# Patient Record
Sex: FEMALE | Race: WHITE
[De-identification: names, ages, dates, MRNs, and addresses within clinical notes are randomized per-mention and may not be internally consistent; named-entity substitution may affect disease eponyms.]

---

## 2017-04-30 ENCOUNTER — HOSPITAL ENCOUNTER (EMERGENCY)
Dept: HOSPITAL 23 - SED | Age: 42
Discharge: HOME | End: 2017-04-30
Payer: COMMERCIAL
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
## 2017-04-30 DIAGNOSIS — S93.401A: Primary | ICD-10-CM
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
## 2017-04-30 DIAGNOSIS — F17.210: ICD-10-CM

## 2017-04-30 DIAGNOSIS — Z88.5: ICD-10-CM

## 2017-04-30 DIAGNOSIS — I10: ICD-10-CM

## 2017-04-30 DIAGNOSIS — Z79.899: ICD-10-CM

## 2018-05-25 ENCOUNTER — OFFICE VISIT (OUTPATIENT)
Dept: OBSTETRICS AND GYNECOLOGY | Age: 43
End: 2018-05-25

## 2018-05-25 VITALS
DIASTOLIC BLOOD PRESSURE: 70 MMHG | WEIGHT: 138 LBS | SYSTOLIC BLOOD PRESSURE: 110 MMHG | BODY MASS INDEX: 25.4 KG/M2 | HEIGHT: 62 IN

## 2018-05-25 DIAGNOSIS — N39.44 NOCTURNAL ENURESIS: ICD-10-CM

## 2018-05-25 DIAGNOSIS — Z01.419 ENCOUNTER FOR GYNECOLOGICAL EXAMINATION: Primary | ICD-10-CM

## 2018-05-25 PROCEDURE — 99396 PREV VISIT EST AGE 40-64: CPT | Performed by: NURSE PRACTITIONER

## 2018-05-25 RX ORDER — OMEPRAZOLE 40 MG/1
CAPSULE, DELAYED RELEASE ORAL
Refills: 2 | COMMUNITY
Start: 2018-04-30 | End: 2021-03-22

## 2018-05-25 RX ORDER — BREXPIPRAZOLE 1 MG/1
TABLET ORAL DAILY
Refills: 4 | COMMUNITY
Start: 2018-04-10 | End: 2021-03-22

## 2018-05-25 RX ORDER — TRAMADOL HYDROCHLORIDE 50 MG/1
TABLET ORAL
Refills: 0 | COMMUNITY
Start: 2018-03-21 | End: 2021-03-22

## 2018-05-25 RX ORDER — DULOXETIN HYDROCHLORIDE 60 MG/1
CAPSULE, DELAYED RELEASE ORAL
Refills: 0 | COMMUNITY
Start: 2018-02-24 | End: 2021-03-22

## 2018-05-25 RX ORDER — PANCRELIPASE LIPASE, PANCRELIPASE PROTEASE, PANCRELIPASE AMYLASE 5000; 17000; 24000 [USP'U]/1; [USP'U]/1; [USP'U]/1
CAPSULE, DELAYED RELEASE ORAL
Refills: 2 | COMMUNITY
Start: 2018-05-01

## 2018-05-25 RX ORDER — TRAZODONE HYDROCHLORIDE 50 MG/1
TABLET ORAL
Refills: 2 | COMMUNITY
Start: 2018-04-10 | End: 2021-03-22

## 2018-05-25 NOTE — PROGRESS NOTES
Subjective       History of Present Illness    Chief Complaint   Patient presents with   • Gynecologic Exam       Bushra Henriquez is a 42 y.o. female who presents for annual exam.  History of ablation.  Very rare bleeding. Still feels cyclical symptoms. Had TVT 14 years ago. She is having issues at night occasionally with not being able to hold her bladder over night. It only started after she started taking sleeping pills.  Dr Sampson did her TVT.  She went through a divorce and has a new partner. No problems otherwise.    OB History    Para Term  AB Living   2 2           SAB TAB Ectopic Molar Multiple Live Births                    # Outcome Date GA Lbr Juan/2nd Weight Sex Delivery Anes PTL Lv   2 Para            1 Para                   The following portions of the patient's history were reviewed and updated as appropriate: allergies, current medications, past family history, past medical history, past social history, past surgical history and problem list.    Her menses are rare, lasting 0-3 days.    Current contraception: tubal ligation  History of abnormal Pap smear: yes  Received Gardasil immunization: no  Perform regular self breast exam: yes - occasinal  Family history of uterine or ovarian cancer: no  Family History of colon cancer: no  Family history of breast cancer: yes - Maternal aunt (40s)    Mammogram: up to date.  Colonoscopy: up to date.  DEXA: not indicated.  Last Pap:2016 neg    Smoking status: Current Every Day Smoker                                                   Packs/day: 0.00      Years: 0.00         Smokeless tobacco: Not on file                       Exercise: moderately active  Calcium/Vitamin D: uses supplements    The following portions of the patient's history were reviewed and updated as appropriate: allergies, current medications, past family history, past medical history, past social history, past surgical history and problem list.    Review of Systems  "  Constitutional: Negative.    HENT: Negative.    Eyes: Negative.    Respiratory: Negative.    Cardiovascular: Negative.    Gastrointestinal: Negative.    Endocrine: Negative.    Genitourinary: Negative.    Musculoskeletal: Negative.    Skin: Negative.    Allergic/Immunologic: Negative.    Neurological: Negative.    Hematological: Negative.    Psychiatric/Behavioral: Negative.          Objective   Physical Exam   Constitutional: She is oriented to person, place, and time. She appears well-developed and well-nourished.   Neck: No thyroid mass present.   Cardiovascular: Normal rate, regular rhythm and normal heart sounds.    Pulmonary/Chest: Effort normal and breath sounds normal. Right breast exhibits no mass, no nipple discharge, no skin change and no tenderness. Left breast exhibits no mass, no nipple discharge, no skin change and no tenderness.   Abdominal: Soft. There is no tenderness.   Genitourinary: Vagina normal and uterus normal. There is no rash or lesion on the right labia. There is no rash or lesion on the left labia. Cervix exhibits no motion tenderness, no discharge and no friability. Right adnexum displays no mass and no tenderness. Left adnexum displays no mass and no tenderness.   Neurological: She is alert and oriented to person, place, and time.   Psychiatric: She has a normal mood and affect. Her behavior is normal.   Vitals reviewed.      /70   Ht 156.2 cm (61.5\")   Wt 62.6 kg (138 lb)   BMI 25.65 kg/m²     Assessment/Plan   Bushra was seen today for gynecologic exam.    Diagnoses and all orders for this visit:    Encounter for gynecological examination  -     IGP,CtNg,rfx Apt HPV All Pth; Future    Nocturnal enuresis        Breast self exam technique reviewed and patient encouraged to perform self-exam monthly.  Discussed healthy lifestyle modifications.  Pap smear done today with reflex hpv and GC/CHL  Recommended 30 minutes of aerobic exercise five times per week.  Discussed calcium " needs to prevent osteoporosis  Follow up with Dr oliva for urinary concerns.

## 2018-05-29 LAB
C TRACH RRNA CVX QL NAA+PROBE: NEGATIVE
CONV .: NORMAL
CYTOLOGIST CVX/VAG CYTO: NORMAL
CYTOLOGY CVX/VAG DOC THIN PREP: NORMAL
DX ICD CODE: NORMAL
HIV 1 & 2 AB SER-IMP: NORMAL
N GONORRHOEA RRNA CVX QL NAA+PROBE: NEGATIVE
OTHER STN SPEC: NORMAL
PATH REPORT.FINAL DX SPEC: NORMAL
STAT OF ADQ CVX/VAG CYTO-IMP: NORMAL

## 2018-06-01 ENCOUNTER — APPOINTMENT (OUTPATIENT)
Dept: WOMENS IMAGING | Facility: HOSPITAL | Age: 43
End: 2018-06-01

## 2018-06-01 PROCEDURE — 77067 SCR MAMMO BI INCL CAD: CPT | Performed by: RADIOLOGY

## 2019-07-03 ENCOUNTER — TELEPHONE (OUTPATIENT)
Dept: OBSTETRICS AND GYNECOLOGY | Age: 44
End: 2019-07-03

## 2019-07-03 NOTE — TELEPHONE ENCOUNTER
Right breast pains in the nipple, progressively worsening since yesterday.  Wanted an appointment today.  Advised nothing available.  Advised could go to Urgent care or ER for evaluation.  Verbalized understanding.

## 2019-09-12 ENCOUNTER — PROCEDURE VISIT (OUTPATIENT)
Dept: OBSTETRICS AND GYNECOLOGY | Age: 44
End: 2019-09-12

## 2019-09-12 ENCOUNTER — APPOINTMENT (OUTPATIENT)
Dept: WOMENS IMAGING | Facility: HOSPITAL | Age: 44
End: 2019-09-12

## 2019-09-12 ENCOUNTER — OFFICE VISIT (OUTPATIENT)
Dept: OBSTETRICS AND GYNECOLOGY | Age: 44
End: 2019-09-12

## 2019-09-12 VITALS
DIASTOLIC BLOOD PRESSURE: 72 MMHG | BODY MASS INDEX: 25.3 KG/M2 | HEIGHT: 61 IN | SYSTOLIC BLOOD PRESSURE: 120 MMHG | WEIGHT: 134 LBS

## 2019-09-12 DIAGNOSIS — Z00.00 ENCOUNTER FOR ANNUAL PHYSICAL EXAM: ICD-10-CM

## 2019-09-12 DIAGNOSIS — Z12.31 VISIT FOR SCREENING MAMMOGRAM: Primary | ICD-10-CM

## 2019-09-12 DIAGNOSIS — Z01.419 ENCOUNTER FOR GYNECOLOGICAL EXAMINATION: Primary | ICD-10-CM

## 2019-09-12 PROCEDURE — 77067 SCR MAMMO BI INCL CAD: CPT | Performed by: OBSTETRICS & GYNECOLOGY

## 2019-09-12 PROCEDURE — 77067 SCR MAMMO BI INCL CAD: CPT | Performed by: RADIOLOGY

## 2019-09-12 PROCEDURE — 99396 PREV VISIT EST AGE 40-64: CPT | Performed by: OBSTETRICS & GYNECOLOGY

## 2019-09-12 NOTE — PROGRESS NOTES
"Subjective     Chief Complaint   Patient presents with   • Gynecologic Exam     Annual:last pap ,mammo here today         History of Present Illness      Bushra Henriquez is a very pleasant  44 y.o. female who presents for annual exam.  Mammo Exam scheduled today, Contraception none partner has been fixed, Exercise 5 times a week  Patient had an endometrial ablation and a TVT in the past.  She has no gynecological concerns or complaints.  She has had a history of labial cyst but none recently.  She continues to be a smoker and declines any counseling or treatment to try to get her stop at this stage..      Obstetric History:  OB History      Para Term  AB Living    2 2            SAB TAB Ectopic Molar Multiple Live Births                        Menstrual History:     No LMP recorded. Patient has had an ablation.       Sexual History:       Past Medical History:   Diagnosis Date   • Anemia    • Anxiety    • Arthritis mutilans affecting hand (CMS/HCC)    • Breast cyst    • Depression    • Esophageal erosions    • Gastroparesis    • Hypertension    • Labial cyst      Past Surgical History:   Procedure Laterality Date   • BLADDER REPAIR     • CARDIAC CATHETERIZATION      Ablation   • CHOLECYSTECTOMY     • D&C HYSTEROSCOPY ENDOMETRIAL ABLATION DIAGNOSTIC LAPAROSCOPY     • HAND SURGERY     • TUBAL ABDOMINAL LIGATION         SOCIAL Hx:      The following portions of the patient's history were reviewed and updated as appropriate: allergies, current medications, past family history, past medical history, past social history, past surgical history and problem list.    Review of Systems        Except as outlined in history of physical illness, patient denies any changes in her GYN, , GI systems.  All other systems reviewed were negative.         Objective   Physical Exam    /72   Ht 154.9 cm (61\")   Wt 60.8 kg (134 lb)   BMI 25.32 kg/m²     General: Patient is alert and oriented and appears overall " healthy  Neck: Is supple without thyromegaly, no carotid bruits and no lymphadenopathy  Lungs: Clear bilaterally, no wheezing, rhonchi, or rales.  Respiratory rate is normal  Breast: Even, symmetrical, no lymphadenopathy, no retraction, no masses or cysts  Heart: Regular rate and rhythm are appreciated, no murmurs or rubs are heard  Abdomen: Is soft, without organomegaly, bowel sounds are positive, there is no                                rebound or guarding and palpation does not produce any discomfort  Back: Nontender without CVA tenderness  Pelvic: External genitalia appear normal and consistent with mature female.  BUS normal                            Vagina is clean dry without discharge and appears adequately estrogenized, no               lesions or masses are present                         Cervix is noninflamed without discharge or lesions.  There is no cervical motion             tenderness.                Uterus is nonenlarged, without tenderness, and no masses or abnormalities are  present               Adnexa are non-enlarged, non tender               Rectal exam reveals adequate sphincter tone and no masses or lesions are                     appreciated on digital rectal examination.      Annual Well Woman Exam  There is no problem list on file for this patient.                Assessment/Plan   Bushra was seen today for gynecologic exam.    Diagnoses and all orders for this visit:    Encounter for gynecological examination  -     IGP, Apt HPV,rfx 16 / 18,45    Encounter for annual physical exam            Discussed today's findings and concerns with patient.  Continue to recommend regular exercise including cardiovascular and resistance training as well as  breast self-exam. Wellness lab, mammography, & pap smear, in accordance with age guidelines.        All of the patient's questions were addressed and answered, I have encouraged her to call for today's test results if she has not received them  within 10 days.  Patient is advised to call with any change in her condition or with any other questions, otherwise return in 12 months for annual examination.

## 2019-09-13 ENCOUNTER — HOSPITAL ENCOUNTER (OUTPATIENT)
Dept: CARDIOLOGY | Facility: HOSPITAL | Age: 44
Discharge: HOME | End: 2019-09-13
Admitting: PLASTIC SURGERY

## 2019-09-13 ENCOUNTER — TRANSCRIBE ORDERS (OUTPATIENT)
Dept: ADMINISTRATIVE | Facility: HOSPITAL | Age: 44
End: 2019-09-13

## 2019-09-13 DIAGNOSIS — Z01.818 PRE-OP TESTING: ICD-10-CM

## 2019-09-13 DIAGNOSIS — Z01.818 PRE-OP TESTING: Primary | ICD-10-CM

## 2019-09-13 PROCEDURE — 93010 ELECTROCARDIOGRAM REPORT: CPT | Performed by: INTERNAL MEDICINE

## 2019-09-13 PROCEDURE — 93005 ELECTROCARDIOGRAM TRACING: CPT | Performed by: PLASTIC SURGERY

## 2019-09-17 LAB
CYTOLOGIST CVX/VAG CYTO: NORMAL
CYTOLOGY CVX/VAG DOC CYTO: NORMAL
CYTOLOGY CVX/VAG DOC THIN PREP: NORMAL
DX ICD CODE: NORMAL
HIV 1 & 2 AB SER-IMP: NORMAL
HPV I/H RISK 4 DNA CVX QL PROBE+SIG AMP: NEGATIVE
Lab: NORMAL
OTHER STN SPEC: NORMAL
STAT OF ADQ CVX/VAG CYTO-IMP: NORMAL

## 2019-10-22 ENCOUNTER — HOSPITAL ENCOUNTER (EMERGENCY)
Facility: HOSPITAL | Age: 44
Discharge: HOME | End: 2019-10-23
Attending: EMERGENCY MEDICINE | Admitting: EMERGENCY MEDICINE

## 2019-10-22 DIAGNOSIS — G89.18 POST-OPERATIVE PAIN: Primary | ICD-10-CM

## 2019-10-22 DIAGNOSIS — R11.2 NAUSEA AND VOMITING, INTRACTABILITY OF VOMITING NOT SPECIFIED, UNSPECIFIED VOMITING TYPE: ICD-10-CM

## 2019-10-22 PROCEDURE — 96375 TX/PRO/DX INJ NEW DRUG ADDON: CPT

## 2019-10-22 PROCEDURE — 25010000002 PROMETHAZINE PER 50 MG: Performed by: EMERGENCY MEDICINE

## 2019-10-22 PROCEDURE — 25010000002 MORPHINE PER 10 MG: Performed by: EMERGENCY MEDICINE

## 2019-10-22 PROCEDURE — 99283 EMERGENCY DEPT VISIT LOW MDM: CPT

## 2019-10-22 PROCEDURE — 96374 THER/PROPH/DIAG INJ IV PUSH: CPT

## 2019-10-22 RX ORDER — MORPHINE SULFATE 2 MG/ML
4 INJECTION, SOLUTION INTRAMUSCULAR; INTRAVENOUS ONCE
Status: COMPLETED | OUTPATIENT
Start: 2019-10-22 | End: 2019-10-22

## 2019-10-22 RX ORDER — PROMETHAZINE HYDROCHLORIDE 25 MG/ML
12.5 INJECTION, SOLUTION INTRAMUSCULAR; INTRAVENOUS ONCE
Status: COMPLETED | OUTPATIENT
Start: 2019-10-22 | End: 2019-10-22

## 2019-10-22 RX ADMIN — MORPHINE SULFATE 4 MG: 2 INJECTION, SOLUTION INTRAMUSCULAR; INTRAVENOUS at 23:29

## 2019-10-22 RX ADMIN — PROMETHAZINE HYDROCHLORIDE 12.5 MG: 25 INJECTION INTRAMUSCULAR; INTRAVENOUS at 23:24

## 2019-10-22 RX ADMIN — SODIUM CHLORIDE 1000 ML: 9 INJECTION, SOLUTION INTRAVENOUS at 23:29

## 2019-10-23 VITALS
RESPIRATION RATE: 20 BRPM | TEMPERATURE: 97.8 F | HEIGHT: 62 IN | OXYGEN SATURATION: 96 % | HEART RATE: 70 BPM | BODY MASS INDEX: 24.51 KG/M2 | DIASTOLIC BLOOD PRESSURE: 96 MMHG | SYSTOLIC BLOOD PRESSURE: 138 MMHG

## 2019-10-23 PROCEDURE — 96376 TX/PRO/DX INJ SAME DRUG ADON: CPT

## 2019-10-23 PROCEDURE — 25010000002 MORPHINE PER 10 MG: Performed by: EMERGENCY MEDICINE

## 2019-10-23 RX ORDER — MORPHINE SULFATE 2 MG/ML
2 INJECTION, SOLUTION INTRAMUSCULAR; INTRAVENOUS ONCE
Status: COMPLETED | OUTPATIENT
Start: 2019-10-23 | End: 2019-10-23

## 2019-10-23 RX ORDER — PROMETHAZINE HYDROCHLORIDE 25 MG/1
25 SUPPOSITORY RECTAL EVERY 6 HOURS PRN
Qty: 8 SUPPOSITORY | Refills: 0 | Status: SHIPPED | OUTPATIENT
Start: 2019-10-23 | End: 2021-03-22

## 2019-10-23 RX ADMIN — MORPHINE SULFATE 2 MG: 2 INJECTION, SOLUTION INTRAMUSCULAR; INTRAVENOUS at 00:37

## 2019-10-23 NOTE — ED NOTES
Pt here with right wrist pain after joint replacement surgery  Today. MD that  Performed the surgery on phone with pt's daughter while this nurse in room. Pt rates pain 10/10 has not been able to Hold down pain pills due  To vomiting     Amy Goldsmith RN  10/22/19 7493

## 2019-10-23 NOTE — ED PROVIDER NOTES
" EMERGENCY DEPARTMENT ENCOUNTER    CHIEF COMPLAINT  Chief Complaint: right wrist pain  History given by: patient  History limited by: none  Room Number: 09/09  PMD: Brad Hardy MD      HPI:  Pt is a 44 y.o. female who presents with hx of osteoarthritis complaining of right wrist pain that has been constant since 1330. Pt states that earlier today she had a right CNC joint replacement due to her osteoarthritis and states that \"she has had constant pain since the nerve block wore off around 1330.\" Pt states that she was not given ODT zofran and that she has been unable to keep down the pain medication or zofran due to vomiting. Pt states that she had two episodes of vomiting due to the pain. Pt states that she contacted her surgeon PTA who instructed her to increase her pain medication. Pt states that pain is worse with movement. Family at bedside.         Duration:  10 hours  Onset: gradual  Timing: constant  Location: right wrist  Radiation: none  Quality: pain  Intensity/Severity: moderate  Progression: unchanged  Associated Symptoms: vomiting  Aggravating Factors: movement  Alleviating Factors: none  Previous Episodes: hx of osteoarthritis   Treatment before arrival: Pt contacted her surgeon who instructed her to increase her pain medication.     PAST MEDICAL HISTORY  Active Ambulatory Problems     Diagnosis Date Noted   • No Active Ambulatory Problems     Resolved Ambulatory Problems     Diagnosis Date Noted   • No Resolved Ambulatory Problems     Past Medical History:   Diagnosis Date   • Anemia    • Anxiety    • Arthritis mutilans affecting hand (CMS/HCC)    • Breast cyst    • Depression    • Esophageal erosions    • Gastroparesis    • Hypertension    • Labial cyst        PAST SURGICAL HISTORY  Past Surgical History:   Procedure Laterality Date   • BLADDER REPAIR     • CARDIAC CATHETERIZATION      Ablation   • CHOLECYSTECTOMY     • D&C HYSTEROSCOPY ENDOMETRIAL ABLATION DIAGNOSTIC LAPAROSCOPY     • " HAND SURGERY     • TUBAL ABDOMINAL LIGATION         FAMILY HISTORY  Family History   Problem Relation Age of Onset   • Diabetes Father         Type 2   • Hyperlipidemia Father    • Depression Mother    • Anemia Mother    • Hypertension Mother    • Arthritis Paternal Grandfather    • Stroke Paternal Grandfather    • Alcohol abuse Paternal Grandfather    • Hyperlipidemia Paternal Grandfather    • Hypertension Paternal Grandfather    • Diabetes Paternal Grandfather         Type 2   • Arthritis Paternal Grandmother    • Hyperlipidemia Paternal Grandmother    • Hypertension Paternal Grandmother    • Bipolar disorder Paternal Grandmother    • Arthritis Maternal Grandmother    • Osteoporosis Maternal Grandmother    • Arthritis Maternal Grandfather        SOCIAL HISTORY  Social History     Socioeconomic History   • Marital status:      Spouse name: Not on file   • Number of children: Not on file   • Years of education: Not on file   • Highest education level: Not on file   Tobacco Use   • Smoking status: Current Every Day Smoker   • Smokeless tobacco: Never Used   Substance and Sexual Activity   • Alcohol use: No   • Drug use: No       ALLERGIES  Codeine and Lorazepam    REVIEW OF SYSTEMS  Review of Systems   Constitutional: Negative for fever.   Eyes: Negative.    Respiratory: Negative for shortness of breath.    Cardiovascular: Negative for chest pain.   Gastrointestinal: Positive for vomiting (due to pain). Negative for abdominal pain and diarrhea.   Musculoskeletal: Positive for arthralgias (right wrist pain).   Skin: Negative for rash.   Allergic/Immunologic: Negative.    Neurological: Negative for weakness and numbness.   Hematological: Negative.    Psychiatric/Behavioral: Negative.    All other systems reviewed and are negative.      PHYSICAL EXAM  ED Triage Vitals [10/22/19 2224]   Temp Heart Rate Resp BP SpO2   97.8 °F (36.6 °C) 96 18 -- 99 %      Temp src Heart Rate Source Patient Position BP Location FiO2  (%)   Tympanic Monitor -- -- --       Physical Exam  General: Awake, alert, no acute distress  HEENT: EOMI  Pulm: Symmetric chest rise, nonlabored breathing  CV: Regular rate and rhythm  GI: Non-distended  MSK: Post surgical cast to the right hand and wrist without significant skin discoloration to the fingers or forearm, no significant edema, normal sensation, moving digits appropriately in right hand  Skin: Warm, dry  Neuro: Alert and oriented x 3, moving all extremities, no focal deficits  Psych: Calm, cooperative    Vital signs and nursing notes reviewed.       PROCEDURES  Procedures      PROGRESS AND CONSULTS  ED Course as of Oct 23 0322   Wed Oct 23, 2019   0057 Patient came in today for postoperative pain that was uncontrolled.  She had a right wrist/hand surgery earlier today, nerve block wore off and she had severe pain in her right wrist.  Exam was benign for any concerns for compartment syndrome or neurovascular complication at this time.  Patient is nausea has been improved with IV Phenergan, has gotten IV morphine with improvement in pain.  She has been able to tolerate her oral pain medications, and will be sent home with a new prescription for Phenergan and advised to follow-up with her surgeon tomorrow.  Advised return to the emergency department for worsening symptoms as needed.  [DC]      ED Course User Index  [DC] Davonte Smith MD       3907-Discussed with pt the plan to attempt pain control in the ED. Pt states that she is only here for pain control and declined further work up including labs/imaging studies. Ordered IVF, morphine for pain, and phenergan for nausea. The patient indicates understanding of these issues and agrees with the plan.    0011-Rechecked pt. Pt is resting and states that pain has improved along with her nausea. Pt still reports mild pain to her right wrist. Notified pt that since she is coming off the nerve block the goal is not complete pain relief but pain control.  Discussed the plan to order additional morphine IV along with one PO pain pill to see if pt is able to tolerate PO. Pt understands and agrees with the plan, all questions answered.    0058-Rechecked pt. Pt is resting comfortably and has been able to tolerate PO in the ED. Discussed the plan to discharge the pt home with prescriptions for phenergan. I instructed the pt to f/u with her surgeon and PCP for further evaluation and care as needed. Family at bedside states she will be driving pt home. Pt understands and agrees with the plan, all questions answered.      MEDICAL DECISION MAKING  Results were reviewed/discussed with the patient and they were also made aware of online access.     MDM  Number of Diagnoses or Management Options  Nausea and vomiting, intractability of vomiting not specified, unspecified vomiting type:   Post-operative pain:      Amount and/or Complexity of Data Reviewed  Decide to obtain previous medical records or to obtain history from someone other than the patient: yes           DIAGNOSIS  Final diagnoses:   Post-operative pain   Nausea and vomiting, intractability of vomiting not specified, unspecified vomiting type       DISPOSITION  DISCHARGE    Patient discharged in stable condition.    Reviewed implications of results, diagnosis, meds, responsibility to follow up, warning signs and symptoms of possible worsening, potential complications and reasons to return to ER.    Patient/Family voiced understanding of above instructions.    Discussed plan for discharge, as there is no emergent indication for admission. Patient referred to primary care provider for BP management due to today's BP. Pt/family is agreeable and understands need for follow up and repeat testing.  Pt is aware that discharge does not mean that nothing is wrong but it indicates no emergency is present that requires admission and they must continue care with follow-up as given below or physician of their choice.      FOLLOW-UP  [facility] Emergency Department  [address]  [facility] [address] [phone]    As needed, If symptoms worsen    Brad Hardy MD  [address]  [facility] [address] [phone]    Schedule an appointment as soon as possible for a visit   As needed         Medication List      New Prescriptions    promethazine 25 MG suppository  Commonly known as:  PHENERGAN  Insert 1 suppository into the rectum Every 6 (Six) Hours As Needed for   Nausea or Vomiting.              Latest Documented Vital Signs:  As of 3:22 AM  BP- 138/96 HR- 70 Temp- 97.8 °F (36.6 °C) (Tympanic) O2 sat- 96%    --  Documentation assistance provided by eduarda Kim for MD Brody.  Information recorded by the scribe was done at my direction and has been verified and validated by me.            Vivian Kim  10/23/19 0101       Davonte Smtih MD  10/23/19 3111

## 2019-10-23 NOTE — ED TRIAGE NOTES
Pt c/o right shoulder pain with nausea that started today after her surgery today. Pt had cmc joint operated on. mary regan

## 2019-10-23 NOTE — DISCHARGE INSTRUCTIONS
Continue your current medications, take the medication as prescribed, drink plenty fluids, follow-up with your surgeon later today for recheck, return to the emergency department for worsening symptoms as needed.

## 2020-09-15 ENCOUNTER — PROCEDURE VISIT (OUTPATIENT)
Dept: OBSTETRICS AND GYNECOLOGY | Age: 45
End: 2020-09-15

## 2020-09-15 ENCOUNTER — APPOINTMENT (OUTPATIENT)
Dept: WOMENS IMAGING | Facility: HOSPITAL | Age: 45
End: 2020-09-15

## 2020-09-15 DIAGNOSIS — Z12.31 VISIT FOR SCREENING MAMMOGRAM: Primary | ICD-10-CM

## 2020-09-15 PROCEDURE — 77067 SCR MAMMO BI INCL CAD: CPT | Performed by: RADIOLOGY

## 2020-09-15 PROCEDURE — 77067 SCR MAMMO BI INCL CAD: CPT | Performed by: OBSTETRICS & GYNECOLOGY

## 2020-10-14 ENCOUNTER — OFFICE VISIT (OUTPATIENT)
Dept: OBSTETRICS AND GYNECOLOGY | Age: 45
End: 2020-10-14

## 2020-10-14 VITALS
SYSTOLIC BLOOD PRESSURE: 108 MMHG | HEIGHT: 61 IN | BODY MASS INDEX: 22.84 KG/M2 | DIASTOLIC BLOOD PRESSURE: 70 MMHG | WEIGHT: 121 LBS

## 2020-10-14 DIAGNOSIS — Z01.419 ENCOUNTER FOR GYNECOLOGICAL EXAMINATION: Primary | ICD-10-CM

## 2020-10-14 DIAGNOSIS — R35.1 NOCTURIA: ICD-10-CM

## 2020-10-14 DIAGNOSIS — N92.6 IRREGULAR MENSES: ICD-10-CM

## 2020-10-14 PROCEDURE — 99396 PREV VISIT EST AGE 40-64: CPT | Performed by: OBSTETRICS & GYNECOLOGY

## 2020-10-14 NOTE — PROGRESS NOTES
Subjective     Chief Complaint   Patient presents with   • Gynecologic Exam     Annual:last pap ,mammo 9/15/20,Discuss increased spotting since Ablation         History of Present Illness      Bushra Henriquez is a very pleasant  45 y.o. female who presents for annual exam.  Mammo Exam 2020, Contraception vasectomy, Exercise 3 times a week  Patient is in for annual exam.  She is getting her wellness labs with her PCP.  She continues to smoke but had quit for a little while.  After some counseling she would like to try to stop again and she is going to do so.  She had an endometrial ablation approximately  it had worked very well but more recently she is having some spotting.  She thinks this is associated with what would be her normal menstrual cycle.  She also had a TVT somewhere around  by Dr. Sampson.  She now complains of nocturia.  She does not lose urine during the course of the day..      Obstetric History:  OB History        2    Para   2    Term                AB        Living           SAB        TAB        Ectopic        Molar        Multiple        Live Births                   Menstrual History:     No LMP recorded. Patient has had an ablation.       Sexual History:       Past Medical History:   Diagnosis Date   • Anemia    • Anxiety    • Arthritis mutilans affecting hand (CMS/HCC)    • Breast cyst    • Depression    • Esophageal erosions    • Gastroparesis    • Hypertension    • Labial cyst      Past Surgical History:   Procedure Laterality Date   • BLADDER REPAIR     • CARDIAC CATHETERIZATION      Ablation   • CHOLECYSTECTOMY     • D&C HYSTEROSCOPY ENDOMETRIAL ABLATION DIAGNOSTIC LAPAROSCOPY     • HAND SURGERY     • TUBAL ABDOMINAL LIGATION         SOCIAL Hx:      The following portions of the patient's history were reviewed and updated as appropriate: allergies, current medications, past family history, past medical history, past social history, past surgical history  "and problem list.    Review of Systems        Except as outlined in history of physical illness, patient denies any changes in her GYN, , GI systems.  All other systems reviewed were negative.         Objective   Physical Exam    /70   Ht 154.9 cm (61\")   Wt 54.9 kg (121 lb)   Breastfeeding No   BMI 22.86 kg/m²     General: Patient is alert and oriented and appears overall healthy  Neck: Is supple without thyromegaly, no carotid bruits and no lymphadenopathy  Lungs: Clear bilaterally, no wheezing, rhonchi, or rales.  Respiratory rate is normal  Breast: Even, symmetrical, no lymphadenopathy, no retraction, no masses or cysts  Heart: Regular rate and rhythm are appreciated, no murmurs or rubs are heard  Abdomen: Is soft, without organomegaly, bowel sounds are positive, there is no                                rebound or guarding and palpation does not produce any discomfort  Back: Nontender without CVA tenderness  Pelvic: External genitalia appear normal and consistent with mature female.  BUS normal                            Vagina is clean dry without discharge and appears adequately estrogenized, no               lesions or masses are present                         Cervix is noninflamed without discharge or lesions.  There is no cervical motion             tenderness.                Uterus is nonenlarged, without tenderness, and no masses or abnormalities are  present               Adnexa are non-enlarged, non tender               Rectal exam reveals adequate sphincter tone and no masses or lesions are                     appreciated on digital rectal examination.      Annual Well Woman Exam  There is no problem list on file for this patient.                Assessment/Plan   Diagnoses and all orders for this visit:    1. Encounter for gynecological examination (Primary)  -     IGP, Apt HPV,rfx 16 / 18,45    2. Nocturia      TVT by Dr. Sampson 2005, does not lose urine during the day but does have " nocturia would like to potentially see urology and that order has been placed for referral  3. Irregular menses       Clinically consistent with ablation wearing down.  Spotting is most likely in accordance with what be her normal menstrual cycle.  Not realize it in any other therapy but if she stop smoking, she will call and we will get her on low-dose birth control pills continuously to stop the spotting for about 6 months    Discussed today's findings and concerns with patient.  Continue to recommend regular exercise including cardiovascular and resistance training as well as  breast self-exam. Wellness lab, mammography, & pap smear, in accordance with age guidelines.    I have encouraged her to call for today's test results if she has not received them within 10 days.  Patient is advised to call with any change in her condition or with any other questions, otherwise return  for annual examination.

## 2020-10-16 LAB
CYTOLOGIST CVX/VAG CYTO: NORMAL
CYTOLOGY CVX/VAG DOC CYTO: NORMAL
CYTOLOGY CVX/VAG DOC THIN PREP: NORMAL
DX ICD CODE: NORMAL
HIV 1 & 2 AB SER-IMP: NORMAL
HPV I/H RISK 4 DNA CVX QL PROBE+SIG AMP: NEGATIVE
OTHER STN SPEC: NORMAL
STAT OF ADQ CVX/VAG CYTO-IMP: NORMAL

## 2020-10-22 ENCOUNTER — TELEPHONE (OUTPATIENT)
Dept: OBSTETRICS AND GYNECOLOGY | Age: 45
End: 2020-10-22

## 2020-10-22 NOTE — TELEPHONE ENCOUNTER
----- Message from JELANI Morel sent at 10/22/2020 12:03 PM EDT -----  Let her know her pap and hpv results are negative

## 2021-03-22 ENCOUNTER — OFFICE VISIT (OUTPATIENT)
Dept: OBSTETRICS AND GYNECOLOGY | Age: 46
End: 2021-03-22

## 2021-03-22 VITALS
WEIGHT: 127 LBS | BODY MASS INDEX: 23.37 KG/M2 | DIASTOLIC BLOOD PRESSURE: 64 MMHG | SYSTOLIC BLOOD PRESSURE: 106 MMHG | HEIGHT: 62 IN

## 2021-03-22 DIAGNOSIS — N90.7 SEBACEOUS CYST OF LABIA: Primary | ICD-10-CM

## 2021-03-22 PROCEDURE — 99213 OFFICE O/P EST LOW 20 MIN: CPT | Performed by: NURSE PRACTITIONER

## 2021-03-22 RX ORDER — SERTRALINE HYDROCHLORIDE 100 MG/1
TABLET, FILM COATED ORAL
COMMUNITY
Start: 2021-03-08

## 2021-03-22 NOTE — PROGRESS NOTES
"Subjective     Chief Complaint   Patient presents with   • Gynecologic Exam     Spot on vagina, burning feeling       Bushra Defler is a 45 y.o.  whose LMP is No LMP recorded. Patient has had an ablation.     Pt presents today with chief complaint of spot on left side of vagina  She states Thursday she noticed it and was burning for a few hours   She has no longer had any burning or pain  No open lesions or drainage  Denies pelvic pain, vaginal discharge or dysuria  No new sexual partners, in monogamous relationship  Pt of Dr. Olson      No Additional Complaints Reported    The following portions of the patient's history were reviewed and updated as appropriate:vital signs, allergies, current medications, past medical history, past social history, past surgical history and problem list      Review of Systems   A comprehensive review of systems was negative except for: Genitourinary: positive for genital lesions     Objective      /64   Ht 157.5 cm (62\")   Wt 57.6 kg (127 lb)   BMI 23.23 kg/m²     Physical Exam    General:   alert and no distress   Heart: Not performed today   Lungs: Not performed today.   Breast: Not performed today   Neck: na   Abdomen: {Not performed today   CVA: Not performed today   Pelvis: External genitalia: normal general appearance and single small dome shaped lesion to left labia that is firm yellow/white color, no s/s infection  Urinary system: urethral meatus normal  Vaginal: normal mucosa without prolapse or lesions, normal without tenderness, induration or masses and normal rugae  Cervix: normal appearance  Adnexa: normal bimanual exam  Uterus: normal single, nontender   Extremities: Not performed today   Neurologic: negative   Psychiatric: Normal affect, judgement, and mood       Lab Review   Labs: No data reviewed     Imaging   No data reviewed    Assessment/Plan     ASSESSMENT  1. Sebaceous cyst of labia        PLAN  1. No orders of the defined types were placed in this " encounter.      2. Medications prescribed this encounter:      No orders of the defined types were placed in this encounter.      3. Reassurance given. Discussed if starts to cause pain, get larger or s/s infection to follow up.    Maday Bro, APRN  3/22/2021

## 2021-07-28 ENCOUNTER — OFFICE VISIT (OUTPATIENT)
Dept: OBSTETRICS AND GYNECOLOGY | Age: 46
End: 2021-07-28

## 2021-07-28 VITALS
SYSTOLIC BLOOD PRESSURE: 118 MMHG | BODY MASS INDEX: 22.26 KG/M2 | WEIGHT: 121 LBS | DIASTOLIC BLOOD PRESSURE: 64 MMHG | HEIGHT: 62 IN

## 2021-07-28 DIAGNOSIS — Z11.3 SCREEN FOR STD (SEXUALLY TRANSMITTED DISEASE): ICD-10-CM

## 2021-07-28 DIAGNOSIS — R10.2 PELVIC PAIN: Primary | ICD-10-CM

## 2021-07-28 LAB
BILIRUB BLD-MCNC: NEGATIVE MG/DL
CLARITY, POC: CLEAR
COLOR UR: YELLOW
GLUCOSE UR STRIP-MCNC: NEGATIVE MG/DL
KETONES UR QL: NEGATIVE
LEUKOCYTE EST, POC: ABNORMAL
NITRITE UR-MCNC: NEGATIVE MG/ML
PH UR: 5.5 [PH] (ref 5–8)
PROT UR STRIP-MCNC: NEGATIVE MG/DL
RBC # UR STRIP: NEGATIVE /UL
SP GR UR: 1.01 (ref 1–1.03)
UROBILINOGEN UR QL: NORMAL

## 2021-07-28 PROCEDURE — 81002 URINALYSIS NONAUTO W/O SCOPE: CPT | Performed by: PHYSICIAN ASSISTANT

## 2021-07-28 PROCEDURE — 99212 OFFICE O/P EST SF 10 MIN: CPT | Performed by: PHYSICIAN ASSISTANT

## 2021-07-28 NOTE — PROGRESS NOTES
"Subjective     Chief Complaint   Patient presents with   • Gynecologic Exam     follow up, seen kayleigh for bump on labia, c/o it is not better and sometimes she has pain that radiates.       Bushra Henriquez is a 46 y.o.  whose LMP is No LMP recorded. Patient has had an ablation. presents with pain and a cyst    She was seen in March for a cyst  It is still there  Notes sharp pain in vagina-\"on side of lips\"  Comes and goes quickly  Feels like \"something is not right down there\"  No change n bowels or bladder    Pain hits several times a day-5-7 times a day  Feels like a 'burning'  feesl it around her labia    No skin changes noted    She denies risk of std but would like to be tested      No Additional Complaints Reported    The following portions of the patient's history were reviewed and updated as appropriate:vital signs, allergies, current medications, past family history, past medical history, past social history, past surgical history and problem list      Review of Systems   Genitourinary:positive for vaginal pain and a cyst     Objective      /64   Ht 157.5 cm (62\")   Wt 54.9 kg (121 lb)   Breastfeeding No   BMI 22.13 kg/m²     Physical Exam    General:   alert, comfortable and no distress   Heart: Not performed today   Lungs: Not performed today.   Breast: Not performed today   Neck: na   Abdomen: {Not performed today   CVA: Not performed today   Pelvis: External genitalia: normal general appearance  Vaginal: normal mucosa without prolapse or lesions  Cervix: normal appearance  Adnexa: normal bimanual exam  Uterus: normal single, nontender   Extremities: Not performed today   Neurologic: negative   Psychiatric: Normal affect, judgement, and mood       Lab Review   Labs: Urinalysis - with micro     Imaging   No data reviewed    Assessment/Plan     ASSESSMENT  1. Pelvic pain        PLAN  1.   Orders Placed This Encounter   Procedures   • Urine Culture - , Urine, Clean Catch   • POC Urinalysis " Dipstick       2. No unusual findings during exam today. [facility] notes some skin lesions/skin changes but they do not appear unusual. No s/s of infection. No obvious cause for her shooting pain. Pelvic exam was unremarkable but an u/s could be helpful to r/o cysts/etc.  Sounds like nerve pain so enc f/u with PCP as well.     Follow up: prn for u/s    JELANI Peres  7/28/2021

## 2021-07-30 LAB
BACTERIA UR CULT: NORMAL
BACTERIA UR CULT: NORMAL
C TRACH RRNA SPEC QL NAA+PROBE: NEGATIVE
N GONORRHOEA RRNA SPEC QL NAA+PROBE: NEGATIVE

## 2022-09-28 ENCOUNTER — OFFICE VISIT (OUTPATIENT)
Dept: OBSTETRICS AND GYNECOLOGY | Age: 47
End: 2022-09-28

## 2022-09-28 VITALS
HEIGHT: 62 IN | WEIGHT: 119 LBS | DIASTOLIC BLOOD PRESSURE: 70 MMHG | SYSTOLIC BLOOD PRESSURE: 110 MMHG | BODY MASS INDEX: 21.9 KG/M2

## 2022-09-28 DIAGNOSIS — Z12.4 SCREENING FOR CERVICAL CANCER: ICD-10-CM

## 2022-09-28 DIAGNOSIS — Z01.419 ENCOUNTER FOR GYNECOLOGICAL EXAMINATION: ICD-10-CM

## 2022-09-28 DIAGNOSIS — Z12.31 BREAST CANCER SCREENING BY MAMMOGRAM: Primary | ICD-10-CM

## 2022-09-28 DIAGNOSIS — Z00.00 ENCOUNTER FOR ANNUAL PHYSICAL EXAM: ICD-10-CM

## 2022-09-28 PROCEDURE — 99396 PREV VISIT EST AGE 40-64: CPT | Performed by: OBSTETRICS & GYNECOLOGY

## 2022-09-28 RX ORDER — DULOXETIN HYDROCHLORIDE 60 MG/1
60 CAPSULE, DELAYED RELEASE ORAL DAILY
COMMUNITY
Start: 2022-08-11

## 2022-09-28 RX ORDER — TRAZODONE HYDROCHLORIDE 50 MG/1
50 TABLET ORAL NIGHTLY PRN
COMMUNITY
Start: 2022-09-14

## 2022-09-28 NOTE — PROGRESS NOTES
Subjective     Chief Complaint   Patient presents with   • Gynecologic Exam     Annual:Last pap 10/20,mammo          History of Present Illness      Bushra Henriquez is a very pleasant  47 y.o. female who presents for annual exam.  Mammo Exam ordered for the near future as she is overdue, Contraception none, Exercise yes    Patient has had an endometrial ablation she only has very light spotting.    She has no gynecological concerns or complaints    .      Obstetric History:  OB History        2    Para   2    Term                AB        Living           SAB        IAB        Ectopic        Molar        Multiple        Live Births                   Menstrual History:     No LMP recorded. Patient has had an ablation.       Sexual History:       Past Medical History:   Diagnosis Date   • Anemia    • Anxiety    • Arthritis mutilans affecting hand (HCC)    • Breast cyst    • Depression    • Esophageal erosions    • Gastroparesis    • Hypertension    • Labial cyst      Past Surgical History:   Procedure Laterality Date   • BLADDER REPAIR     • CARDIAC CATHETERIZATION      Ablation   • CHOLECYSTECTOMY     • D & C HYSTEROSCOPY ENDOMETRIAL ABLATION DIAGNOSTIC LAPAROSCOPY     • HAND SURGERY     • TUBAL ABDOMINAL LIGATION         SOCIAL Hx:      The following portions of the patient's history were reviewed and updated as appropriate: allergies, current medications, past family history, past medical history, past social history, past surgical history and problem list.    Review of Systems        Except as outlined in history of physical illness, patient denies any changes in her GYN, , GI systems.  All other systems reviewed were negative.         Current Outpatient Medications:   •  DULoxetine (CYMBALTA) 60 MG capsule, Take 60 mg by mouth Daily., Disp: , Rfl:   •  ZENPEP 5000-31174 units capsule delayed-release particles, TK 4 CS PO QID ., Disp: , Rfl: 2  •  sertraline (ZOLOFT) 100 MG tablet, , Disp: , Rfl:  "  •  traZODone (DESYREL) 50 MG tablet, Take 50 mg by mouth At Night As Needed., Disp: , Rfl:    Objective   Physical Exam    /70   Ht 157.5 cm (62\")   Wt 54 kg (119 lb)   Breastfeeding No   BMI 21.77 kg/m²     General: Patient is alert and oriented and appears overall healthy  Neck: Is supple without thyromegaly, no carotid bruits and no lymphadenopathy  Lungs: Clear bilaterally, no wheezing, rhonchi, or rales.  Respiratory rate is normal  Breast: Even, symmetrical, no lymphadenopathy, no retraction, no masses or cysts  Heart: Regular rate and rhythm are appreciated, no murmurs or rubs are heard  Abdomen: Is soft, without organomegaly, bowel sounds are positive, there is no rebound or guarding and palpation does not produce any discomfort  Back: Nontender without CVA tenderness  Pelvic: External genitalia appear normal and consistent with mature female.  BUS normal                            Vagina is clean dry without discharge and appears adequately estrogenized, no lesions or masses are present                         Cervix is noninflamed without discharge or lesions.  There is no cervical motion tenderness.                Uterus is nonenlarged, without tenderness, and no masses or abnormalities are  present               Adnexa are non-enlarged, non tender               Rectal exam reveals adequate sphincter tone and no masses or lesions are appreciated on digital rectal examination.      Annual Well Woman Exam  There is no problem list on file for this patient.                Assessment & Plan   Diagnoses and all orders for this visit:    1. Breast cancer screening by mammogram (Primary)  -     Mammo Screening Digital Tomosynthesis Bilateral With CAD; Future    2. Screening for cervical cancer    3. Encounter for annual physical exam    Pap smear done today    Follow-up with us elevated cholesterol with her PCP      Discussed today's findings and concerns with patient.  Continue to recommend regular " exercise including cardiovascular and resistance training as well as  breast self-exam. Wellness lab, mammography, & pap smear, in accordance with age guidelines.    I have encouraged her to call for today's test results if she has not received them within 10 days.  Patient is advised to call with any change in her condition or with any other questions, otherwise return  for annual examination.

## 2022-10-05 LAB
CYTOLOGIST CVX/VAG CYTO: ABNORMAL
CYTOLOGY CVX/VAG DOC CYTO: ABNORMAL
CYTOLOGY CVX/VAG DOC THIN PREP: ABNORMAL
DX ICD CODE: ABNORMAL
DX ICD CODE: ABNORMAL
HIV 1 & 2 AB SER-IMP: ABNORMAL
HPV I/H RISK 4 DNA CVX QL PROBE+SIG AMP: NEGATIVE
OTHER STN SPEC: ABNORMAL
PATHOLOGIST CVX/VAG CYTO: ABNORMAL
STAT OF ADQ CVX/VAG CYTO-IMP: ABNORMAL

## 2023-10-16 ENCOUNTER — OFFICE VISIT (OUTPATIENT)
Dept: OBSTETRICS AND GYNECOLOGY | Age: 48
End: 2023-10-16
Payer: COMMERCIAL

## 2023-10-16 VITALS
WEIGHT: 118 LBS | SYSTOLIC BLOOD PRESSURE: 130 MMHG | BODY MASS INDEX: 21.71 KG/M2 | DIASTOLIC BLOOD PRESSURE: 80 MMHG | HEIGHT: 62 IN

## 2023-10-16 DIAGNOSIS — Z12.4 SCREENING FOR CERVICAL CANCER: ICD-10-CM

## 2023-10-16 DIAGNOSIS — Z12.31 BREAST CANCER SCREENING BY MAMMOGRAM: Primary | ICD-10-CM

## 2023-10-16 DIAGNOSIS — L73.9 FOLLICULITIS: ICD-10-CM

## 2023-10-16 RX ORDER — CEPHALEXIN 250 MG/1
250 CAPSULE ORAL 3 TIMES DAILY
Qty: 15 CAPSULE | Refills: 0 | Status: SHIPPED | OUTPATIENT
Start: 2023-10-16

## 2023-10-16 RX ORDER — FLUCONAZOLE 150 MG/1
150 TABLET ORAL DAILY
Qty: 3 TABLET | Refills: 0 | Status: SHIPPED | OUTPATIENT
Start: 2023-10-16 | End: 2023-10-19

## 2023-10-16 NOTE — PROGRESS NOTES
Subjective     Chief Complaint   Patient presents with    Gynecologic Exam     Annual:last pap ,mammo ,Infected hair follicle         History of Present Illness    Wellness exam  Bushra Henriquez is a very pleasant  48 y.o. female who presents for annual exam.  Mammo Exam overdue placement did not follow-up with our 2 previous mammogram orders but has promised she will this time, Contraception none, Exercise occasionally 3-4 times a week    Patient has had an endometrial ablation and TVT    Her only concern is she has a folliculitis on her right vulva which she drained earlier this morning.    She is receiving wellness labs elsewhere..      Obstetric History:  OB History          2    Para   2    Term                AB        Living             SAB        IAB        Ectopic        Molar        Multiple        Live Births                   Menstrual History:     No LMP recorded. Patient has had an ablation.       Sexual History:       Past Medical History:   Diagnosis Date    Anemia     Anxiety     Arthritis mutilans affecting hand     Breast cyst     Depression     Esophageal erosions     Gastroparesis     Hypertension     Labial cyst      Past Surgical History:   Procedure Laterality Date    BLADDER REPAIR      CARDIAC CATHETERIZATION      Ablation    CHOLECYSTECTOMY      D & C HYSTEROSCOPY ENDOMETRIAL ABLATION DIAGNOSTIC LAPAROSCOPY      HAND SURGERY      TUBAL ABDOMINAL LIGATION         SOCIAL Hx:      The following portions of the patient's history were reviewed and updated as appropriate: allergies, current medications, past family history, past medical history, past social history, past surgical history and problem list.    Review of Systems        Except as outlined in history of physical illness, patient denies any changes in her GYN, , GI systems.  All other systems reviewed were negative.         Current Outpatient Medications:     traZODone (DESYREL) 50 MG tablet, Take 1 tablet by mouth  "At Night As Needed., Disp: , Rfl:     ZENPEP [id number] units capsule delayed-release particles, TK 4 CS PO QID ., Disp: , Rfl: 2    cephalexin (Keflex) 250 MG capsule, Take 1 capsule by mouth 3 (Three) Times a Day., Disp: 15 capsule, Rfl: 0   Objective   Physical Exam    /80   Ht 157.5 cm (62\")   Wt 53.5 kg (118 lb)   BMI 21.58 kg/m²     General: Patient is alert and oriented and appears overall healthy  Neck: Is supple without thyromegaly, no carotid bruits and no lymphadenopathy  Lungs: Clear bilaterally, no wheezing, rhonchi, or rales.  Respiratory rate is normal  Breast: Even, symmetrical, no lymphadenopathy, no retraction, no masses or cysts  Heart: Regular rate and rhythm are appreciated, no murmurs or rubs are heard  Abdomen: Is soft, without organomegaly, bowel sounds are positive, there is no rebound or guarding and palpation does not produce any discomfort  Back: Nontender without CVA tenderness  Pelvic: External genitalia appear  consistent with mature female.  BUS normal    there is a folliculitis on the right upper vulvar region.  Fortunately there is no erythema its 5 mm in thickness and it was opened with a 25-gauge needle.  Very small amount of serous blood-tinged fluid was noted.  No inguinal adenopathy noted                            Vagina is clean dry without discharge and appears adequately estrogenized, no lesions or masses are present                         Cervix is noninflamed without discharge or lesions.  There is no cervical motion tenderness.                Uterus is nonenlarged, without tenderness, and no masses or abnormalities are  present               Adnexa are non-enlarged, non tender               Rectal exam reveals adequate sphincter tone and no masses or lesions are appreciated on digital rectal examination.      Annual Well Woman Exam  There is no problem list on file for this patient.                Assessment & Plan   Diagnoses and all orders for this " visit:    1. Breast cancer screening by mammogram (Primary)  -     Mammo Screening Digital Tomosynthesis Bilateral With CAD; Future    2. Screening for cervical cancer    3. Folliculitis    Other orders  -     cephalexin (Keflex) 250 MG capsule; Take 1 capsule by mouth 3 (Three) Times a Day.  Dispense: 15 capsule; Refill: 0      Discussed today's findings and concerns with patient.  Continue to recommend regular exercise including cardiovascular and resistance training as well as  breast self-exam. Wellness lab, mammography, & pap smear, in accordance with age guidelines.    I have encouraged her to call for today's test results if she has not received them within 10 days.  Patient is advised to call with any change in her condition or with any other questions, otherwise return  for annual examination.

## 2023-11-02 ENCOUNTER — TELEPHONE (OUTPATIENT)
Dept: OBSTETRICS AND GYNECOLOGY | Age: 48
End: 2023-11-02

## 2023-11-02 NOTE — TELEPHONE ENCOUNTER
Caller: Bushra Henriquez    Relationship to patient: Self    Best call back number: [phone]    Patient is needing: PT NEEDING TO R/S MAMMOGRAM APPT FROM 11/2/23, PLEASE ADVISE PT ON SCHEDULING.

## 2023-11-15 ENCOUNTER — HOSPITAL ENCOUNTER (OUTPATIENT)
Facility: HOSPITAL | Age: 48
Discharge: HOME | End: 2023-11-15
Admitting: OBSTETRICS & GYNECOLOGY
Payer: COMMERCIAL

## 2023-11-15 DIAGNOSIS — Z12.31 BREAST CANCER SCREENING BY MAMMOGRAM: ICD-10-CM

## 2023-11-15 PROCEDURE — 77067 SCR MAMMO BI INCL CAD: CPT

## 2023-11-15 PROCEDURE — 77063 BREAST TOMOSYNTHESIS BI: CPT

## 2024-02-16 ENCOUNTER — OFFICE VISIT (OUTPATIENT)
Dept: OBSTETRICS AND GYNECOLOGY | Age: 49
End: 2024-02-16
Payer: COMMERCIAL

## 2024-02-16 VITALS
DIASTOLIC BLOOD PRESSURE: 64 MMHG | BODY MASS INDEX: 22.45 KG/M2 | SYSTOLIC BLOOD PRESSURE: 106 MMHG | WEIGHT: 122 LBS | HEIGHT: 62 IN

## 2024-02-16 DIAGNOSIS — N30.00 ACUTE CYSTITIS WITHOUT HEMATURIA: Primary | ICD-10-CM

## 2024-02-16 DIAGNOSIS — N89.8 VAGINAL ODOR: ICD-10-CM

## 2024-02-16 DIAGNOSIS — R82.90 ABNORMAL URINE ODOR: ICD-10-CM

## 2024-02-16 LAB
BILIRUB BLD-MCNC: NEGATIVE MG/DL
CLARITY, POC: ABNORMAL
COLOR UR: YELLOW
GLUCOSE UR STRIP-MCNC: NEGATIVE MG/DL
KETONES UR QL: NEGATIVE
LEUKOCYTE EST, POC: ABNORMAL
NITRITE UR-MCNC: POSITIVE MG/ML
PH UR: 6 [PH] (ref 5–8)
PROT UR STRIP-MCNC: NEGATIVE MG/DL
RBC # UR STRIP: NEGATIVE /UL
SP GR UR: 1.02 (ref 1–1.03)
UROBILINOGEN UR QL: ABNORMAL

## 2024-02-16 RX ORDER — BUSPIRONE HYDROCHLORIDE 5 MG/1
5 TABLET ORAL 3 TIMES DAILY
COMMUNITY

## 2024-02-16 RX ORDER — CEPHALEXIN 500 MG/1
500 CAPSULE ORAL 2 TIMES DAILY
Qty: 14 CAPSULE | Refills: 0 | Status: SHIPPED | OUTPATIENT
Start: 2024-02-16 | End: 2024-02-23

## 2024-02-16 RX ORDER — FLUCONAZOLE 150 MG/1
150 TABLET ORAL DAILY
Qty: 2 TABLET | Refills: 1 | Status: SHIPPED | OUTPATIENT
Start: 2024-02-16

## 2024-02-16 RX ORDER — DULOXETIN HYDROCHLORIDE 20 MG/1
20 CAPSULE, DELAYED RELEASE ORAL DAILY
COMMUNITY

## 2024-02-19 LAB
BACTERIA UR CULT: ABNORMAL
BACTERIA UR CULT: ABNORMAL
OTHER ANTIBIOTIC SUSC ISLT: ABNORMAL

## 2024-02-20 DIAGNOSIS — N76.0 BV (BACTERIAL VAGINOSIS): Primary | ICD-10-CM

## 2024-02-20 DIAGNOSIS — B96.89 BV (BACTERIAL VAGINOSIS): Primary | ICD-10-CM

## 2024-02-20 DIAGNOSIS — N30.00 ACUTE CYSTITIS WITHOUT HEMATURIA: ICD-10-CM

## 2024-02-20 LAB
A VAGINAE DNA VAG QL NAA+PROBE: ABNORMAL SCORE
BVAB2 DNA VAG QL NAA+PROBE: ABNORMAL SCORE
C ALBICANS DNA VAG QL NAA+PROBE: NEGATIVE
C GLABRATA DNA VAG QL NAA+PROBE: NEGATIVE
C TRACH DNA VAG QL NAA+PROBE: NEGATIVE
MEGA1 DNA VAG QL NAA+PROBE: ABNORMAL SCORE
N GONORRHOEA DNA VAG QL NAA+PROBE: NEGATIVE
T VAGINALIS DNA VAG QL NAA+PROBE: NEGATIVE

## 2024-02-20 RX ORDER — FLUCONAZOLE 150 MG/1
150 TABLET ORAL DAILY
Qty: 2 TABLET | Refills: 1 | Status: SHIPPED | OUTPATIENT
Start: 2024-02-20

## 2024-02-20 RX ORDER — METRONIDAZOLE 500 MG/1
500 TABLET ORAL 2 TIMES DAILY
Qty: 14 TABLET | Refills: 0 | Status: SHIPPED | OUTPATIENT
Start: 2024-02-20 | End: 2024-02-21

## 2024-02-21 ENCOUNTER — TELEPHONE (OUTPATIENT)
Dept: OBSTETRICS AND GYNECOLOGY | Age: 49
End: 2024-02-21
Payer: COMMERCIAL

## 2024-02-21 DIAGNOSIS — B96.89 BV (BACTERIAL VAGINOSIS): Primary | ICD-10-CM

## 2024-02-21 DIAGNOSIS — N76.0 BV (BACTERIAL VAGINOSIS): Primary | ICD-10-CM

## 2024-02-21 RX ORDER — METRONIDAZOLE 7.5 MG/G
GEL VAGINAL NIGHTLY
Qty: 70 G | Refills: 0 | Status: SHIPPED | OUTPATIENT
Start: 2024-02-21 | End: 2024-02-26

## 2024-02-21 NOTE — TELEPHONE ENCOUNTER
Caller: Bushra Henriquez    Relationship: Self    Best call back number: [phone]    What is the best time to reach you: ANY    Who are you requesting to speak with (clinical staff, provider,  specific staff member): KAREN    Do you know the name of the person who called: KAREN    What was the call regarding: POSS TEST RESULTS    Is it okay if the provider responds through MyChart:

## 2024-03-05 ENCOUNTER — TELEPHONE (OUTPATIENT)
Dept: OBSTETRICS AND GYNECOLOGY | Age: 49
End: 2024-03-05
Payer: COMMERCIAL

## 2024-03-05 ENCOUNTER — OFFICE VISIT (OUTPATIENT)
Dept: OBSTETRICS AND GYNECOLOGY | Age: 49
End: 2024-03-05
Payer: COMMERCIAL

## 2024-03-05 VITALS
SYSTOLIC BLOOD PRESSURE: 104 MMHG | BODY MASS INDEX: 20.98 KG/M2 | HEIGHT: 62 IN | DIASTOLIC BLOOD PRESSURE: 68 MMHG | WEIGHT: 114 LBS

## 2024-03-05 DIAGNOSIS — N30.01 ACUTE CYSTITIS WITH HEMATURIA: Primary | ICD-10-CM

## 2024-03-05 DIAGNOSIS — R39.15 URGENCY OF URINATION: ICD-10-CM

## 2024-03-05 LAB
BILIRUB BLD-MCNC: NEGATIVE MG/DL
CLARITY, POC: CLEAR
COLOR UR: YELLOW
GLUCOSE UR STRIP-MCNC: NEGATIVE MG/DL
KETONES UR QL: NEGATIVE
LEUKOCYTE EST, POC: ABNORMAL
NITRITE UR-MCNC: POSITIVE MG/ML
PH UR: 6 [PH] (ref 5–8)
PROT UR STRIP-MCNC: NEGATIVE MG/DL
RBC # UR STRIP: ABNORMAL /UL
SP GR UR: 1.01 (ref 1–1.03)
UROBILINOGEN UR QL: ABNORMAL

## 2024-03-05 RX ORDER — NITROFURANTOIN 25; 75 MG/1; MG/1
100 CAPSULE ORAL 2 TIMES DAILY
Qty: 14 CAPSULE | Refills: 0 | Status: SHIPPED | OUTPATIENT
Start: 2024-03-05 | End: 2024-03-12

## 2024-03-05 RX ORDER — FLUCONAZOLE 150 MG/1
150 TABLET ORAL ONCE
Qty: 1 TABLET | Refills: 0 | Status: SHIPPED | OUTPATIENT
Start: 2024-03-05 | End: 2024-03-05

## 2024-03-05 NOTE — PROGRESS NOTES
"Subjective     Chief Complaint   Patient presents with    Gynecologic Exam     Reoccuring UTI, pt took Diflucan, strong smell still existing, and urgency, pt thinks she has thrush now       Bushra Henriquez is a 48 y.o.  whose LMP is No LMP recorded. Patient has had an ablation.     Pt presents today with UTI s/s   She was seen on  for UTI symptoms  Culture + for UTI and vaginal swab + for BV  She was given Keflex and flagyl  She was later given diflucan for yeast  She is still having urinary urgency and odor  Also thinks she may have thrush from the antibiotics    No Additional Complaints Reported    The following portions of the patient's history were reviewed and updated as appropriate:vital signs, allergies, current medications, past medical history, past social history, past surgical history, and problem list      Review of Systems   Pertinent items are noted in HPI.     Objective      /68   Ht 157.5 cm (62\")   Wt 51.7 kg (114 lb)   BMI 20.85 kg/m²     Physical Exam    General:   alert and no distress   Heart: Not performed today   Lungs: Not performed today.   Breast: Not performed today   Neck: na   Abdomen: {Not performed today   CVA: Not performed today   Pelvis: Not performed today   Extremities: Not performed today   Neurologic: negative   Psychiatric: Normal affect, judgement, and mood       Lab Review   Labs: UA     Imaging   No data reviewed    Assessment & Plan     ASSESSMENT  1. Acute cystitis with hematuria    2. Urgency of urination        PLAN  1.   Orders Placed This Encounter   Procedures    POC Urinalysis Dipstick       2. Medications prescribed this encounter:        New Medications Ordered This Visit   Medications    nitrofurantoin, macrocrystal-monohydrate, (Macrobid) 100 MG capsule     Sig: Take 1 capsule by mouth 2 (Two) Times a Day for 7 days.     Dispense:  14 capsule     Refill:  0    fluconazole (Diflucan) 150 MG tablet     Sig: Take 1 tablet by mouth 1 (One) Time for 1 " dose.     Dispense:  1 tablet     Refill:  0    nystatin (MYCOSTATIN) 100,000 unit/mL suspension     Sig: Swish and swallow 5 mL 4 (Four) Times a Day for 14 days.     Dispense:  473 mL     Refill:  0       3. [place] for UTI. Diflucan sent. Will do nystatin mouthwash for thrush. Will call with urine culture results.     Follow up: RADHA Bro, YUNI  3/5/2024

## 2024-03-08 LAB
BACTERIA UR CULT: ABNORMAL
BACTERIA UR CULT: ABNORMAL
OTHER ANTIBIOTIC SUSC ISLT: ABNORMAL

## 2024-11-05 ENCOUNTER — APPOINTMENT (OUTPATIENT)
Dept: CT IMAGING | Facility: HOSPITAL | Age: 49
DRG: 440 | End: 2024-11-05
Payer: COMMERCIAL

## 2024-11-05 ENCOUNTER — HOSPITAL ENCOUNTER (INPATIENT)
Facility: HOSPITAL | Age: 49
LOS: 5 days | Discharge: HOME | DRG: 440 | End: 2024-11-10
Attending: STUDENT IN AN ORGANIZED HEALTH CARE EDUCATION/TRAINING PROGRAM | Admitting: HOSPITALIST
Payer: COMMERCIAL

## 2024-11-05 DIAGNOSIS — K85.00 IDIOPATHIC ACUTE PANCREATITIS, UNSPECIFIED COMPLICATION STATUS: Primary | ICD-10-CM

## 2024-11-05 PROBLEM — K85.90 ACUTE PANCREATITIS: Status: ACTIVE | Noted: 2024-11-05

## 2024-11-05 LAB
ALBUMIN SERPL-MCNC: 4.6 G/DL (ref 3.5–5.2)
ALBUMIN/GLOB SERPL: 1.3 G/DL
ALP SERPL-CCNC: 60 U/L (ref 39–117)
ALT SERPL W P-5'-P-CCNC: 34 U/L (ref 1–33)
ANION GAP SERPL CALCULATED.3IONS-SCNC: 17.3 MMOL/L (ref 5–15)
AST SERPL-CCNC: 35 U/L (ref 1–32)
BACTERIA UR QL AUTO: ABNORMAL /HPF
BASOPHILS # BLD AUTO: 0.03 10*3/MM3 (ref 0–0.2)
BASOPHILS NFR BLD AUTO: 0.3 % (ref 0–1.5)
BILIRUB SERPL-MCNC: 1 MG/DL (ref 0–1.2)
BILIRUB UR QL STRIP: NEGATIVE
BUN SERPL-MCNC: 7 MG/DL (ref 6–20)
BUN/CREAT SERPL: 9.3 (ref 7–25)
CALCIUM SPEC-SCNC: 9.7 MG/DL (ref 8.6–10.5)
CHLORIDE SERPL-SCNC: 94 MMOL/L (ref 98–107)
CLARITY UR: CLEAR
CO2 SERPL-SCNC: 23.7 MMOL/L (ref 22–29)
COLOR UR: YELLOW
CREAT SERPL-MCNC: 0.75 MG/DL (ref 0.57–1)
D-LACTATE SERPL-SCNC: 1.7 MMOL/L (ref 0.5–2)
DEPRECATED RDW RBC AUTO: 41.5 FL (ref 37–54)
EGFRCR SERPLBLD CKD-EPI 2021: 97.7 ML/MIN/1.73
EOSINOPHIL # BLD AUTO: 0.01 10*3/MM3 (ref 0–0.4)
EOSINOPHIL NFR BLD AUTO: 0.1 % (ref 0.3–6.2)
ERYTHROCYTE [DISTWIDTH] IN BLOOD BY AUTOMATED COUNT: 11.5 % (ref 12.3–15.4)
GLOBULIN UR ELPH-MCNC: 3.5 GM/DL
GLUCOSE SERPL-MCNC: 148 MG/DL (ref 65–99)
GLUCOSE UR STRIP-MCNC: NEGATIVE MG/DL
HCT VFR BLD AUTO: 40.7 % (ref 34–46.6)
HGB BLD-MCNC: 14.6 G/DL (ref 12–15.9)
HGB UR QL STRIP.AUTO: ABNORMAL
HYALINE CASTS UR QL AUTO: ABNORMAL /LPF
IMM GRANULOCYTES # BLD AUTO: 0.04 10*3/MM3 (ref 0–0.05)
IMM GRANULOCYTES NFR BLD AUTO: 0.4 % (ref 0–0.5)
KETONES UR QL STRIP: ABNORMAL
LEUKOCYTE ESTERASE UR QL STRIP.AUTO: NEGATIVE
LIPASE SERPL-CCNC: 1920 U/L (ref 13–60)
LYMPHOCYTES # BLD AUTO: 0.92 10*3/MM3 (ref 0.7–3.1)
LYMPHOCYTES NFR BLD AUTO: 8.1 % (ref 19.6–45.3)
MCH RBC QN AUTO: 35.4 PG (ref 26.6–33)
MCHC RBC AUTO-ENTMCNC: 35.9 G/DL (ref 31.5–35.7)
MCV RBC AUTO: 98.8 FL (ref 79–97)
MONOCYTES # BLD AUTO: 0.59 10*3/MM3 (ref 0.1–0.9)
MONOCYTES NFR BLD AUTO: 5.2 % (ref 5–12)
NEUTROPHILS NFR BLD AUTO: 85.9 % (ref 42.7–76)
NEUTROPHILS NFR BLD AUTO: 9.71 10*3/MM3 (ref 1.7–7)
NITRITE UR QL STRIP: NEGATIVE
NRBC BLD AUTO-RTO: 0 /100 WBC (ref 0–0.2)
PH UR STRIP.AUTO: 5.5 [PH] (ref 5–8)
PLATELET # BLD AUTO: 227 10*3/MM3 (ref 140–450)
PMV BLD AUTO: 9.2 FL (ref 6–12)
POTASSIUM SERPL-SCNC: 3.5 MMOL/L (ref 3.5–5.2)
PROT SERPL-MCNC: 8.1 G/DL (ref 6–8.5)
PROT UR QL STRIP: ABNORMAL
RBC # BLD AUTO: 4.12 10*6/MM3 (ref 3.77–5.28)
RBC # UR STRIP: ABNORMAL /HPF
REF LAB TEST METHOD: ABNORMAL
SODIUM SERPL-SCNC: 135 MMOL/L (ref 136–145)
SP GR UR STRIP: >1.03 (ref 1–1.03)
SQUAMOUS #/AREA URNS HPF: ABNORMAL /HPF
UROBILINOGEN UR QL STRIP: ABNORMAL
WBC # UR STRIP: ABNORMAL /HPF
WBC NRBC COR # BLD AUTO: 11.3 10*3/MM3 (ref 3.4–10.8)

## 2024-11-05 PROCEDURE — 25010000002 HYDRALAZINE PER 20 MG: Performed by: HOSPITALIST

## 2024-11-05 PROCEDURE — 25010000002 SODIUM CHLORIDE 0.9 % WITH KCL 20 MEQ 20-0.9 MEQ/L-% SOLUTION: Performed by: HOSPITALIST

## 2024-11-05 PROCEDURE — 25010000002 MORPHINE PER 10 MG: Performed by: STUDENT IN AN ORGANIZED HEALTH CARE EDUCATION/TRAINING PROGRAM

## 2024-11-05 PROCEDURE — 83605 ASSAY OF LACTIC ACID: CPT | Performed by: STUDENT IN AN ORGANIZED HEALTH CARE EDUCATION/TRAINING PROGRAM

## 2024-11-05 PROCEDURE — 25010000002 DROPERIDOL PER 5 MG: Performed by: STUDENT IN AN ORGANIZED HEALTH CARE EDUCATION/TRAINING PROGRAM

## 2024-11-05 PROCEDURE — 25010000002 HYDROMORPHONE PER 4 MG: Performed by: HOSPITALIST

## 2024-11-05 PROCEDURE — 80053 COMPREHEN METABOLIC PANEL: CPT | Performed by: STUDENT IN AN ORGANIZED HEALTH CARE EDUCATION/TRAINING PROGRAM

## 2024-11-05 PROCEDURE — 85025 COMPLETE CBC W/AUTO DIFF WBC: CPT | Performed by: STUDENT IN AN ORGANIZED HEALTH CARE EDUCATION/TRAINING PROGRAM

## 2024-11-05 PROCEDURE — 87040 BLOOD CULTURE FOR BACTERIA: CPT | Performed by: STUDENT IN AN ORGANIZED HEALTH CARE EDUCATION/TRAINING PROGRAM

## 2024-11-05 PROCEDURE — 36415 COLL VENOUS BLD VENIPUNCTURE: CPT

## 2024-11-05 PROCEDURE — 25010000002 PIPERACILLIN SOD-TAZOBACTAM PER 1 G: Performed by: STUDENT IN AN ORGANIZED HEALTH CARE EDUCATION/TRAINING PROGRAM

## 2024-11-05 PROCEDURE — 25010000002 DIPHENHYDRAMINE PER 50 MG: Performed by: STUDENT IN AN ORGANIZED HEALTH CARE EDUCATION/TRAINING PROGRAM

## 2024-11-05 PROCEDURE — 74177 CT ABD & PELVIS W/CONTRAST: CPT

## 2024-11-05 PROCEDURE — 25810000003 LACTATED RINGERS SOLUTION: Performed by: STUDENT IN AN ORGANIZED HEALTH CARE EDUCATION/TRAINING PROGRAM

## 2024-11-05 PROCEDURE — 81001 URINALYSIS AUTO W/SCOPE: CPT | Performed by: STUDENT IN AN ORGANIZED HEALTH CARE EDUCATION/TRAINING PROGRAM

## 2024-11-05 PROCEDURE — 25510000001 IOPAMIDOL 61 % SOLUTION: Performed by: STUDENT IN AN ORGANIZED HEALTH CARE EDUCATION/TRAINING PROGRAM

## 2024-11-05 PROCEDURE — 83690 ASSAY OF LIPASE: CPT | Performed by: STUDENT IN AN ORGANIZED HEALTH CARE EDUCATION/TRAINING PROGRAM

## 2024-11-05 PROCEDURE — 25010000002 ONDANSETRON PER 1 MG: Performed by: HOSPITALIST

## 2024-11-05 PROCEDURE — 99285 EMERGENCY DEPT VISIT HI MDM: CPT

## 2024-11-05 RX ORDER — DICYCLOMINE HYDROCHLORIDE 10 MG/1
10 CAPSULE ORAL 4 TIMES DAILY PRN
COMMUNITY

## 2024-11-05 RX ORDER — HYDRALAZINE HYDROCHLORIDE 20 MG/ML
10 INJECTION INTRAMUSCULAR; INTRAVENOUS EVERY 4 HOURS PRN
Status: DISCONTINUED | OUTPATIENT
Start: 2024-11-05 | End: 2024-11-07

## 2024-11-05 RX ORDER — DROPERIDOL 2.5 MG/ML
5 INJECTION, SOLUTION INTRAMUSCULAR; INTRAVENOUS ONCE
Status: COMPLETED | OUTPATIENT
Start: 2024-11-05 | End: 2024-11-05

## 2024-11-05 RX ORDER — TRAZODONE HYDROCHLORIDE 50 MG/1
50 TABLET, FILM COATED ORAL NIGHTLY
Status: DISCONTINUED | OUTPATIENT
Start: 2024-11-05 | End: 2024-11-10 | Stop reason: HOSPADM

## 2024-11-05 RX ORDER — OMEPRAZOLE 40 MG/1
40 CAPSULE, DELAYED RELEASE ORAL DAILY
COMMUNITY

## 2024-11-05 RX ORDER — IOPAMIDOL 612 MG/ML
100 INJECTION, SOLUTION INTRAVASCULAR
Status: COMPLETED | OUTPATIENT
Start: 2024-11-05 | End: 2024-11-05

## 2024-11-05 RX ORDER — ONDANSETRON 2 MG/ML
4 INJECTION INTRAMUSCULAR; INTRAVENOUS EVERY 4 HOURS PRN
Status: DISCONTINUED | OUTPATIENT
Start: 2024-11-05 | End: 2024-11-10

## 2024-11-05 RX ORDER — SODIUM CHLORIDE AND POTASSIUM CHLORIDE 150; 900 MG/100ML; MG/100ML
75 INJECTION, SOLUTION INTRAVENOUS CONTINUOUS
Status: DISCONTINUED | OUTPATIENT
Start: 2024-11-05 | End: 2024-11-10

## 2024-11-05 RX ORDER — SACCHAROMYCES BOULARDII 250 MG
250 CAPSULE ORAL DAILY
COMMUNITY

## 2024-11-05 RX ORDER — HYDROMORPHONE HYDROCHLORIDE 1 MG/ML
0.5 INJECTION, SOLUTION INTRAMUSCULAR; INTRAVENOUS; SUBCUTANEOUS
Status: DISCONTINUED | OUTPATIENT
Start: 2024-11-05 | End: 2024-11-09

## 2024-11-05 RX ORDER — MORPHINE SULFATE 2 MG/ML
4 INJECTION, SOLUTION INTRAMUSCULAR; INTRAVENOUS ONCE
Status: COMPLETED | OUTPATIENT
Start: 2024-11-05 | End: 2024-11-05

## 2024-11-05 RX ORDER — PROMETHAZINE HYDROCHLORIDE 12.5 MG/1
12.5 TABLET ORAL EVERY 6 HOURS PRN
COMMUNITY

## 2024-11-05 RX ORDER — HYDROMORPHONE HYDROCHLORIDE 1 MG/ML
0.5 INJECTION, SOLUTION INTRAMUSCULAR; INTRAVENOUS; SUBCUTANEOUS EVERY 4 HOURS PRN
Status: DISCONTINUED | OUTPATIENT
Start: 2024-11-05 | End: 2024-11-05

## 2024-11-05 RX ORDER — DIPHENHYDRAMINE HYDROCHLORIDE 50 MG/ML
12.5 INJECTION INTRAMUSCULAR; INTRAVENOUS ONCE
Status: COMPLETED | OUTPATIENT
Start: 2024-11-05 | End: 2024-11-05

## 2024-11-05 RX ORDER — PANTOPRAZOLE SODIUM 40 MG/10ML
40 INJECTION, POWDER, LYOPHILIZED, FOR SOLUTION INTRAVENOUS EVERY 12 HOURS SCHEDULED
Status: DISCONTINUED | OUTPATIENT
Start: 2024-11-05 | End: 2024-11-08

## 2024-11-05 RX ADMIN — IOPAMIDOL 85 ML: 612 INJECTION, SOLUTION INTRAVENOUS at 17:01

## 2024-11-05 RX ADMIN — SODIUM CHLORIDE, POTASSIUM CHLORIDE, SODIUM LACTATE AND CALCIUM CHLORIDE 1000 ML: 600; 310; 30; 20 INJECTION, SOLUTION INTRAVENOUS at 16:24

## 2024-11-05 RX ADMIN — DIPHENHYDRAMINE HYDROCHLORIDE 12.5 MG: 50 INJECTION, SOLUTION INTRAMUSCULAR; INTRAVENOUS at 16:22

## 2024-11-05 RX ADMIN — POTASSIUM CHLORIDE AND SODIUM CHLORIDE 125 ML/HR: 900; 150 INJECTION, SOLUTION INTRAVENOUS at 20:41

## 2024-11-05 RX ADMIN — ONDANSETRON 4 MG: 2 INJECTION, SOLUTION INTRAMUSCULAR; INTRAVENOUS at 20:34

## 2024-11-05 RX ADMIN — TRAZODONE HYDROCHLORIDE 50 MG: 50 TABLET ORAL at 23:20

## 2024-11-05 RX ADMIN — PIPERACILLIN AND TAZOBACTAM 3.38 G: 3; .375 INJECTION, POWDER, FOR SOLUTION INTRAVENOUS at 18:31

## 2024-11-05 RX ADMIN — PANTOPRAZOLE SODIUM 40 MG: 40 INJECTION, POWDER, FOR SOLUTION INTRAVENOUS at 20:34

## 2024-11-05 RX ADMIN — HYDROMORPHONE HYDROCHLORIDE 0.5 MG: 1 INJECTION, SOLUTION INTRAMUSCULAR; INTRAVENOUS; SUBCUTANEOUS at 23:20

## 2024-11-05 RX ADMIN — HYDRALAZINE HYDROCHLORIDE 10 MG: 20 INJECTION INTRAMUSCULAR; INTRAVENOUS at 20:34

## 2024-11-05 RX ADMIN — DROPERIDOL 5 MG: 2.5 INJECTION, SOLUTION INTRAMUSCULAR; INTRAVENOUS at 16:24

## 2024-11-05 RX ADMIN — MORPHINE SULFATE 4 MG: 2 INJECTION, SOLUTION INTRAMUSCULAR; INTRAVENOUS at 17:20

## 2024-11-05 RX ADMIN — HYDROMORPHONE HYDROCHLORIDE 0.5 MG: 1 INJECTION, SOLUTION INTRAMUSCULAR; INTRAVENOUS; SUBCUTANEOUS at 20:34

## 2024-11-05 NOTE — ED NOTES
"Nursing report ED to floor  Bushra Henriquez  49 y.o.  female    HPI :  HPI  Stated Reason for Visit: pt states increased generalized abd pain starting this morning. Pt states 'it feels like someting popped\"  History Obtained From: patient    Chief Complaint  Chief Complaint   Patient presents with    Abdominal Pain       Admitting doctor:   Wander Hyatt MD    Admitting diagnosis:   The encounter diagnosis was Idiopathic acute pancreatitis, unspecified complication status.    Code status:   Current Code Status       Date Active Code Status Order ID Comments User Context       Not on file            Allergies:   Lorazepam and Codeine    Isolation:   No active isolations    Intake and Output  No intake or output data in the 24 hours ending 11/05/24 1750    Weight:       11/05/24  1524   Weight: 51.7 kg (113 lb 15.7 oz)       Most recent vitals:   Vitals:    11/05/24 1524 11/05/24 1546 11/05/24 1631   BP:  (!) 165/107 (!) 160/139   Pulse: 91  91   Resp: 16     Temp: 96.9 °F (36.1 °C)     SpO2: 100%     Weight: 51.7 kg (113 lb 15.7 oz)     Height: 157.5 cm (62\")         Active LDAs/IV Access:   Lines, Drains & Airways       Active LDAs       Name Placement date Placement time Site Days    Peripheral IV 11/05/24 1617 Right Antecubital 11/05/24  1617  Antecubital  less than 1                    Labs (abnormal labs have a star):   Labs Reviewed   COMPREHENSIVE METABOLIC PANEL - Abnormal; Notable for the following components:       Result Value    Glucose 148 (*)     Sodium 135 (*)     Chloride 94 (*)     ALT (SGPT) 34 (*)     AST (SGOT) 35 (*)     Anion Gap 17.3 (*)     All other components within normal limits    Narrative:     GFR Normal >60  Chronic Kidney Disease <60  Kidney Failure <15     LIPASE - Abnormal; Notable for the following components:    Lipase 1,920 (*)     All other components within normal limits   CBC WITH AUTO DIFFERENTIAL - Abnormal; Notable for the following components:    WBC 11.30 (*)     MCV 98.8 (*)  "    MCH 35.4 (*)     MCHC 35.9 (*)     RDW 11.5 (*)     Neutrophil % 85.9 (*)     Lymphocyte % 8.1 (*)     Eosinophil % 0.1 (*)     Neutrophils, Absolute 9.71 (*)     All other components within normal limits   LACTIC ACID, PLASMA - Normal   BLOOD CULTURE   BLOOD CULTURE   URINALYSIS W/ CULTURE IF INDICATED   CBC AND DIFFERENTIAL    Narrative:     The following orders were created for panel order CBC & Differential.  Procedure                               Abnormality         Status                     ---------                               -----------         ------                     CBC Auto Differential[[id number]]        Abnormal            Final result                 Please view results for these tests on the individual orders.       EKG:   No orders to display       Meds given in ED:   Medications   piperacillin-tazobactam (ZOSYN) 3.375 g IVPB in 100 mL NS MBP (CD) (has no administration in time range)   diphenhydrAMINE (BENADRYL) injection 12.5 mg (12.5 mg Intravenous Given 11/5/24 1622)   droperidol (INAPSINE) injection 5 mg (5 mg Intravenous Given 11/5/24 1624)   lactated ringers bolus 1,000 mL (1,000 mL Intravenous New Bag 11/5/24 1624)   morphine injection 4 mg (4 mg Intravenous Given 11/5/24 1720)   iopamidol (ISOVUE-300) 61 % injection 100 mL (85 mL Intravenous Given 11/5/24 1701)       Imaging results:  CT Abdomen Pelvis With Contrast    Result Date: 11/5/2024  1. Acute interstitial edematous pancreatitis. 2. Post cholecystectomy. 3. Bilateral adrenal nodules are indeterminate in density by contrast-enhanced CT and can be further evaluated with nonemergent outpatient noncontrast abdominal CT. Adrenal adenomas would be most likely.  This report was finalized on 11/5/2024 5:25 PM by Dr. Irineo Villa M.D on Workstation: [id number]       Ambulatory status:   - assist x 1     Social issues:   Social History     Socioeconomic History    Marital status:    Tobacco Use    Smoking status:  Former     Types: Cigarettes    Smokeless tobacco: Current   Vaping Use    Vaping status: Never Used   Substance and Sexual Activity    Alcohol use: No    Drug use: No    Sexual activity: Defer     Birth control/protection: None       Peripheral Neurovascular  Peripheral Neurovascular (Adult)  Peripheral Neurovascular WDL: WDL    Neuro Cognitive  Neuro Cognitive (Adult)  Cognitive/Neuro/Behavioral WDL: WDL    Learning  Learning Assessment  Learning Readiness and Ability: no barriers identified    Respiratory  Respiratory WDL  Respiratory WDL: WDL    Abdominal Pain       Pain Assessments  Pain (Adult)  (0-10) Pain Rating: Rest: 10  (0-10) Pain Rating: Activity: 10    NIH Stroke Scale       Da Blackwood RN  11/05/24 17:50 EST

## 2024-11-05 NOTE — ED PROVIDER NOTES
EMERGENCY DEPARTMENT ENCOUNTER  Room Number:  17/17  PCP: Brad Hardy MD  Independent Historians: Patient and Family      HPI:  Chief Complaint: had concerns including Abdominal Pain.     A complete HPI/ROS/PMH/PSH/SH/FH are unobtainable due to: None    Chronic or social conditions impacting patient care (Social Determinants of Health): None      Context: Bushra Henriquez is a 49 y.o. female with a medical history of gastroparesis, reports history of chronic pancreatitis, who presents to the ED c/o acute generalized abdominal pain.  States symptoms started yesterday became severe today.  Generalized abdominal pain associate with nausea and vomiting.  Subjective fever and chills.  Also reports bilateral back pain.  No urinary symptoms.  States feels different than her chronic pancreatitis pain.  No other complaints at this time.      Review of prior external notes (non-ED) -and- Review of prior external test results outside of this encounter:  CT abdomen pelvis 10/8/2024.  Status post cholecystectomy.  No peripancreatic inflammatory change.  No pancreatic atrophy noted.    Prescription drug monitoring program review:         PAST MEDICAL HISTORY  Active Ambulatory Problems     Diagnosis Date Noted    No Active Ambulatory Problems     Resolved Ambulatory Problems     Diagnosis Date Noted    No Resolved Ambulatory Problems     Past Medical History:   Diagnosis Date    Anemia     Anxiety     Arthritis mutilans affecting hand     Breast cyst     Depression     Esophageal erosions     Gastroparesis     Hypertension     Labial cyst          PAST SURGICAL HISTORY  Past Surgical History:   Procedure Laterality Date    BLADDER REPAIR      CARDIAC CATHETERIZATION      Ablation    CHOLECYSTECTOMY      D & C HYSTEROSCOPY ENDOMETRIAL ABLATION DIAGNOSTIC LAPAROSCOPY      HAND SURGERY      TUBAL ABDOMINAL LIGATION           FAMILY HISTORY  Family History   Problem Relation Age of Onset    Diabetes Father         Type 2     Hyperlipidemia Father     Depression Mother     Anemia Mother     Hypertension Mother     Arthritis Paternal Grandfather     Stroke Paternal Grandfather     Alcohol abuse Paternal Grandfather     Hyperlipidemia Paternal Grandfather     Hypertension Paternal Grandfather     Diabetes Paternal Grandfather         Type 2    Arthritis Paternal Grandmother     Hyperlipidemia Paternal Grandmother     Hypertension Paternal Grandmother     Bipolar disorder Paternal Grandmother     Arthritis Maternal Grandmother     Osteoporosis Maternal Grandmother     Arthritis Maternal Grandfather          SOCIAL HISTORY  Social History     Socioeconomic History    Marital status:    Tobacco Use    Smoking status: Former     Types: Cigarettes    Smokeless tobacco: Current   Vaping Use    Vaping status: Never Used   Substance and Sexual Activity    Alcohol use: No    Drug use: No    Sexual activity: Defer     Birth control/protection: None         ALLERGIES  Lorazepam and Codeine      REVIEW OF SYSTEMS  Review of Systems  Included in HPI  All systems reviewed and negative except for those discussed in HPI.      PHYSICAL EXAM    I have reviewed the triage vital signs and nursing notes.    ED Triage Vitals   Temp Heart Rate Resp BP SpO2   11/05/24 1524 11/05/24 1524 11/05/24 1524 11/05/24 1546 11/05/24 1524   96.9 °F (36.1 °C) 91 16 (!) 165/107 100 %      Temp src Heart Rate Source Patient Position BP Location FiO2 (%)   -- -- -- -- --              Physical Exam  GENERAL: alert, uncomfortable appearing  SKIN: Warm, dry  HENT: Normocephalic, atraumatic  EYES: no scleral icterus  CV: regular rhythm, regular rate  RESPIRATORY: normal effort, lungs clear  ABDOMEN: soft, nondistended, normal bowel sounds, minimal tenderness right lower quadrant without rebound, moderate tenderness epigastric and left lower quadrant  MUSCULOSKELETAL: no deformity  NEURO: alert, moves all extremities, follows commands            LAB RESULTS  Recent Results  (from the past 24 hours)   Comprehensive Metabolic Panel    Collection Time: 11/05/24  4:16 PM    Specimen: Blood   Result Value Ref Range    Glucose 148 (H) 65 - 99 mg/dL    BUN 7 6 - 20 mg/dL    Creatinine 0.75 0.57 - 1.00 mg/dL    Sodium 135 (L) 136 - 145 mmol/L    Potassium 3.5 3.5 - 5.2 mmol/L    Chloride 94 (L) 98 - 107 mmol/L    CO2 23.7 22.0 - 29.0 mmol/L    Calcium 9.7 8.6 - 10.5 mg/dL    Total Protein 8.1 6.0 - 8.5 g/dL    Albumin 4.6 3.5 - 5.2 g/dL    ALT (SGPT) 34 (H) 1 - 33 U/L    AST (SGOT) 35 (H) 1 - 32 U/L    Alkaline Phosphatase 60 39 - 117 U/L    Total Bilirubin 1.0 0.0 - 1.2 mg/dL    Globulin 3.5 gm/dL    A/G Ratio 1.3 g/dL    BUN/Creatinine Ratio 9.3 7.0 - 25.0    Anion Gap 17.3 (H) 5.0 - 15.0 mmol/L    eGFR 97.7 >60.0 mL/min/1.73   Lipase    Collection Time: 11/05/24  4:16 PM    Specimen: Blood   Result Value Ref Range    Lipase 1,920 (H) 13 - 60 U/L   Lactic Acid, Plasma    Collection Time: 11/05/24  4:16 PM    Specimen: Blood   Result Value Ref Range    Lactate 1.7 0.5 - 2.0 mmol/L   CBC Auto Differential    Collection Time: 11/05/24  4:16 PM    Specimen: Blood   Result Value Ref Range    WBC 11.30 (H) 3.40 - 10.80 10*3/mm3    RBC 4.12 3.77 - 5.28 10*6/mm3    Hemoglobin 14.6 12.0 - 15.9 g/dL    Hematocrit 40.7 34.0 - 46.6 %    MCV 98.8 (H) 79.0 - 97.0 fL    MCH 35.4 (H) 26.6 - 33.0 pg    MCHC 35.9 (H) 31.5 - 35.7 g/dL    RDW 11.5 (L) 12.3 - 15.4 %    RDW-SD 41.5 37.0 - 54.0 fl    MPV 9.2 6.0 - 12.0 fL    Platelets 227 140 - 450 10*3/mm3    Neutrophil % 85.9 (H) 42.7 - 76.0 %    Lymphocyte % 8.1 (L) 19.6 - 45.3 %    Monocyte % 5.2 5.0 - 12.0 %    Eosinophil % 0.1 (L) 0.3 - 6.2 %    Basophil % 0.3 0.0 - 1.5 %    Immature Grans % 0.4 0.0 - 0.5 %    Neutrophils, Absolute 9.71 (H) 1.70 - 7.00 10*3/mm3    Lymphocytes, Absolute 0.92 0.70 - 3.10 10*3/mm3    Monocytes, Absolute 0.59 0.10 - 0.90 10*3/mm3    Eosinophils, Absolute 0.01 0.00 - 0.40 10*3/mm3    Basophils, Absolute 0.03 0.00 - 0.20 10*3/mm3     Immature Grans, Absolute 0.04 0.00 - 0.05 10*3/mm3    nRBC 0.0 0.0 - 0.2 /100 WBC         RADIOLOGY  CT Abdomen Pelvis With Contrast    Result Date: 11/5/2024  CT ABDOMEN AND PELVIS WITH IV CONTRAST  HISTORY: Abdominal pain. Pancreatitis.  TECHNIQUE: Radiation dose reduction techniques were utilized, including automated exposure control and exposure modulation based on body size. 3 mm images were obtained through the abdomen and pelvis after the administration of IV contrast.  COMPARISON: None available   FINDINGS: Partially visualized lingular calcified granuloma. Diffusely edematous pancreas parenchyma with marked peripancreatic inflammatory changes and moderate volume peripancreatic/mesenteric free fluid layering into the left paracolic gutter (series 2/image 47). Pancreas parenchyma enhances in its entirety. No pancreatic or peripancreatic organized fluid collection. Patent splenic artery and vein. No pseudoaneurysm. Post cholecystectomy. Nondilated biliary tree. Appendix is not definitively identifiable from adjacent terminal ileum. No secondary findings to suggest appendicitis. Bilateral ovarian physiologic follicles. Bilateral fallopian tube occlusion devices. A 2.8 cm right and 1.5 cm left adrenal nodules measure approximately 30 Hounsfield units (series 2/image 32 and series 2/image 36). Remaining solid organs and bowel are normal. No abdominal pelvic adenopathy. No focal osseous abnormality.       1. Acute interstitial edematous pancreatitis. 2. Post cholecystectomy. 3. Bilateral adrenal nodules are indeterminate in density by contrast-enhanced CT and can be further evaluated with nonemergent outpatient noncontrast abdominal CT. Adrenal adenomas would be most likely.  This report was finalized on 11/5/2024 5:25 PM by Dr. Irineo Villa M.D on Workstation: [id number]         MEDICATIONS GIVEN IN ER  Medications   piperacillin-tazobactam (ZOSYN) 3.375 g IVPB in 100 mL NS MBP (CD) (has no  administration in time range)   diphenhydrAMINE (BENADRYL) injection 12.5 mg (12.5 mg Intravenous Given 11/5/24 1622)   droperidol (INAPSINE) injection 5 mg (5 mg Intravenous Given 11/5/24 1624)   lactated ringers bolus 1,000 mL (1,000 mL Intravenous New Bag 11/5/24 1624)   morphine injection 4 mg (4 mg Intravenous Given 11/5/24 1720)   iopamidol (ISOVUE-300) 61 % injection 100 mL (85 mL Intravenous Given 11/5/24 1701)         ORDERS PLACED DURING THIS VISIT:  Orders Placed This Encounter   Procedures    Blood Culture - Blood,    Blood Culture - Blood,    CT Abdomen Pelvis With Contrast    Comprehensive Metabolic Panel    Lipase    Urinalysis With Culture If Indicated - Urine, Clean Catch    Lactic Acid, Plasma    CBC Auto Differential    LIPPS (on-call MD unless specified)    Inpatient Admission    CBC & Differential         OUTPATIENT MEDICATION MANAGEMENT:  Current Facility-Administered Medications Ordered in Epic   Medication Dose Route Frequency Provider Last Rate Last Admin    piperacillin-tazobactam (ZOSYN) 3.375 g IVPB in 100 mL NS MBP (CD)  3.375 g Intravenous Once Rafita Dietrich MD         Current Outpatient Medications Ordered in Epic   Medication Sig Dispense Refill    busPIRone (BUSPAR) 5 MG tablet Take 1 tablet by mouth 3 (Three) Times a Day.      DULoxetine (CYMBALTA) 20 MG capsule Take 1 capsule by mouth Daily.      ZENPEP 5000-48539 units capsule delayed-release particles TK 4 CS PO QID .  2         PROCEDURES  Procedures            PROGRESS, DATA ANALYSIS, CONSULTS, AND MEDICAL DECISION MAKING  All labs have been independently interpreted by me.  All radiology studies have been reviewed by me. All EKG's have been independently viewed and interpreted by me.  Discussion below represents my analysis of pertinent findings related to patient's condition, differential diagnosis, treatment plan and final disposition.    Differential diagnosis includes but is not limited to pancreatitis, gastroparesis,  obstruction, gastroenteritis, colitis, diverticulitis, renal colic.    Clinical Scores:                   ED Course as of 11/05/24 1745   Tue Nov 05, 2024   1602 History of chronic pancreatitis, reports feeling bad yesterday but today had severe onset of generalized abdominal pain as well as pain in her back.  Nausea and vomiting.  Subjective fever and chills.  Different from prior chronic pancreatitis.  Will obtain labs, abdominal imaging, give medication for pain and nausea.  Patient and family agreeable with plan [DN]   1634 WBC(!): 11.30 [DN]   1652 Lactate: 1.7 [DN]   1654 Patient still complaining of pain after droperidol.  Will give IV morphine [DN]   1718 CT Abdomen Pelvis With Contrast  Large amount of free fluid in the epigastric region surrounding the pancreas, no significant free air noted.  Will give IV Zosyn [DN]   1731 Lipase(!): 1,920 [DN]   1740 Creatinine: 0.75 [DN]   1740 ALT (SGPT)(!): 34 [DN]   1741 AST (SGOT)(!): 35 [DN]   1741 Total Bilirubin: 1.0 [DN]   1744 I discussed patient with Dr. Aldrich, agreeable with plan to admit [DN]      ED Course User Index  [DN] Rafita Dietrich MD             AS OF 17:45 EST VITALS:    BP - (!) 160/139  HR - 91  TEMP - 96.9 °F (36.1 °C)  O2 SATS - 100%    COMPLEXITY OF CARE  Admission was considered but after careful review of the patient's presentation, physical examination, diagnostic results, and response to treatment the patient may be safely discharged with outpatient follow-up.      DIAGNOSIS  Final diagnoses:   Idiopathic acute pancreatitis, unspecified complication status         DISPOSITION  ED Disposition       ED Disposition   Decision to Admit    Condition   --    Comment   Level of Care: Telemetry [5]   Diagnosis: Acute pancreatitis [577.0.ICD-9-CM]   Admitting Physician: CARMITA ALDRICH [[id number]]   Attending Physician: CARMITA ALDRICH [[id number]]   Certification: I Certify That Inpatient Hospital Services Are Medically Necessary For Greater Than 2 Midnights                   Please note that portions of this document were completed with a voice recognition program.    Note Disclaimer: At [facility], we believe that sharing information builds trust and better relationships. You are receiving this note because you recently visited [facility]. It is possible you will see health information before a provider has talked with you about it. This kind of information can be easy to misunderstand. To help you fully understand what it means for your health, we urge you to discuss this note with your provider.         Rafita Dietrich MD  11/05/24 8436       Rafita Dietrich MD  11/05/24 7785

## 2024-11-06 LAB
ALBUMIN SERPL-MCNC: 3.9 G/DL (ref 3.5–5.2)
ALBUMIN/GLOB SERPL: 1.5 G/DL
ALP SERPL-CCNC: 44 U/L (ref 39–117)
ALT SERPL W P-5'-P-CCNC: 25 U/L (ref 1–33)
AMYLASE SERPL-CCNC: 886 U/L (ref 28–100)
ANION GAP SERPL CALCULATED.3IONS-SCNC: 12.7 MMOL/L (ref 5–15)
AST SERPL-CCNC: 30 U/L (ref 1–32)
BASOPHILS # BLD AUTO: 0.02 10*3/MM3 (ref 0–0.2)
BASOPHILS NFR BLD AUTO: 0.2 % (ref 0–1.5)
BILIRUB SERPL-MCNC: 0.9 MG/DL (ref 0–1.2)
BUN SERPL-MCNC: 9 MG/DL (ref 6–20)
BUN/CREAT SERPL: 12.7 (ref 7–25)
CALCIUM SPEC-SCNC: 8.6 MG/DL (ref 8.6–10.5)
CHLORIDE SERPL-SCNC: 99 MMOL/L (ref 98–107)
CO2 SERPL-SCNC: 22.3 MMOL/L (ref 22–29)
CREAT SERPL-MCNC: 0.71 MG/DL (ref 0.57–1)
DEPRECATED RDW RBC AUTO: 43.1 FL (ref 37–54)
EGFRCR SERPLBLD CKD-EPI 2021: 104.4 ML/MIN/1.73
EOSINOPHIL # BLD AUTO: 0.02 10*3/MM3 (ref 0–0.4)
EOSINOPHIL NFR BLD AUTO: 0.2 % (ref 0.3–6.2)
ERYTHROCYTE [DISTWIDTH] IN BLOOD BY AUTOMATED COUNT: 11.5 % (ref 12.3–15.4)
GLOBULIN UR ELPH-MCNC: 2.6 GM/DL
GLUCOSE SERPL-MCNC: 106 MG/DL (ref 65–99)
HCT VFR BLD AUTO: 40.5 % (ref 34–46.6)
HGB BLD-MCNC: 14.1 G/DL (ref 12–15.9)
IMM GRANULOCYTES # BLD AUTO: 0.05 10*3/MM3 (ref 0–0.05)
IMM GRANULOCYTES NFR BLD AUTO: 0.4 % (ref 0–0.5)
LIPASE SERPL-CCNC: 2357 U/L (ref 13–60)
LYMPHOCYTES # BLD AUTO: 1.5 10*3/MM3 (ref 0.7–3.1)
LYMPHOCYTES NFR BLD AUTO: 13.4 % (ref 19.6–45.3)
MCH RBC QN AUTO: 35.5 PG (ref 26.6–33)
MCHC RBC AUTO-ENTMCNC: 34.8 G/DL (ref 31.5–35.7)
MCV RBC AUTO: 102 FL (ref 79–97)
MONOCYTES # BLD AUTO: 1.01 10*3/MM3 (ref 0.1–0.9)
MONOCYTES NFR BLD AUTO: 9 % (ref 5–12)
NEUTROPHILS NFR BLD AUTO: 76.8 % (ref 42.7–76)
NEUTROPHILS NFR BLD AUTO: 8.62 10*3/MM3 (ref 1.7–7)
NRBC BLD AUTO-RTO: 0 /100 WBC (ref 0–0.2)
PLATELET # BLD AUTO: 226 10*3/MM3 (ref 140–450)
PMV BLD AUTO: 9.4 FL (ref 6–12)
POTASSIUM SERPL-SCNC: 3.6 MMOL/L (ref 3.5–5.2)
PROT SERPL-MCNC: 6.5 G/DL (ref 6–8.5)
RBC # BLD AUTO: 3.97 10*6/MM3 (ref 3.77–5.28)
SODIUM SERPL-SCNC: 134 MMOL/L (ref 136–145)
WBC NRBC COR # BLD AUTO: 11.22 10*3/MM3 (ref 3.4–10.8)

## 2024-11-06 PROCEDURE — 25010000002 HYDROMORPHONE PER 4 MG: Performed by: HOSPITALIST

## 2024-11-06 PROCEDURE — 85025 COMPLETE CBC W/AUTO DIFF WBC: CPT | Performed by: HOSPITALIST

## 2024-11-06 PROCEDURE — 80053 COMPREHEN METABOLIC PANEL: CPT | Performed by: HOSPITALIST

## 2024-11-06 PROCEDURE — 25010000002 ONDANSETRON PER 1 MG: Performed by: HOSPITALIST

## 2024-11-06 PROCEDURE — 83690 ASSAY OF LIPASE: CPT | Performed by: HOSPITALIST

## 2024-11-06 PROCEDURE — 99222 1ST HOSP IP/OBS MODERATE 55: CPT | Performed by: PHYSICIAN ASSISTANT

## 2024-11-06 PROCEDURE — 82150 ASSAY OF AMYLASE: CPT | Performed by: HOSPITALIST

## 2024-11-06 RX ORDER — BUSPIRONE HYDROCHLORIDE 5 MG/1
5 TABLET ORAL 3 TIMES DAILY
Status: DISCONTINUED | OUTPATIENT
Start: 2024-11-06 | End: 2024-11-10 | Stop reason: HOSPADM

## 2024-11-06 RX ORDER — DIPHENHYDRAMINE HYDROCHLORIDE 50 MG/ML
12.5 INJECTION INTRAMUSCULAR; INTRAVENOUS ONCE
Status: DISCONTINUED | OUTPATIENT
Start: 2024-11-06 | End: 2024-11-06

## 2024-11-06 RX ORDER — SACCHAROMYCES BOULARDII 250 MG
250 CAPSULE ORAL DAILY
Status: DISCONTINUED | OUTPATIENT
Start: 2024-11-06 | End: 2024-11-10 | Stop reason: HOSPADM

## 2024-11-06 RX ORDER — MORPHINE SULFATE 2 MG/ML
4 INJECTION, SOLUTION INTRAMUSCULAR; INTRAVENOUS ONCE
Status: DISCONTINUED | OUTPATIENT
Start: 2024-11-06 | End: 2024-11-06

## 2024-11-06 RX ORDER — DROPERIDOL 2.5 MG/ML
5 INJECTION, SOLUTION INTRAMUSCULAR; INTRAVENOUS ONCE
Status: DISCONTINUED | OUTPATIENT
Start: 2024-11-06 | End: 2024-11-06

## 2024-11-06 RX ORDER — IOPAMIDOL 612 MG/ML
100 INJECTION, SOLUTION INTRAVASCULAR
Status: DISCONTINUED | OUTPATIENT
Start: 2024-11-06 | End: 2024-11-06

## 2024-11-06 RX ADMIN — HYDROMORPHONE HYDROCHLORIDE 0.5 MG: 1 INJECTION, SOLUTION INTRAMUSCULAR; INTRAVENOUS; SUBCUTANEOUS at 10:00

## 2024-11-06 RX ADMIN — PANTOPRAZOLE SODIUM 40 MG: 40 INJECTION, POWDER, FOR SOLUTION INTRAVENOUS at 08:00

## 2024-11-06 RX ADMIN — HYDROMORPHONE HYDROCHLORIDE 0.5 MG: 1 INJECTION, SOLUTION INTRAMUSCULAR; INTRAVENOUS; SUBCUTANEOUS at 22:14

## 2024-11-06 RX ADMIN — HYDROMORPHONE HYDROCHLORIDE 0.5 MG: 1 INJECTION, SOLUTION INTRAMUSCULAR; INTRAVENOUS; SUBCUTANEOUS at 16:20

## 2024-11-06 RX ADMIN — HYDROMORPHONE HYDROCHLORIDE 0.5 MG: 1 INJECTION, SOLUTION INTRAMUSCULAR; INTRAVENOUS; SUBCUTANEOUS at 02:33

## 2024-11-06 RX ADMIN — HYDROMORPHONE HYDROCHLORIDE 0.5 MG: 1 INJECTION, SOLUTION INTRAMUSCULAR; INTRAVENOUS; SUBCUTANEOUS at 19:17

## 2024-11-06 RX ADMIN — HYDROMORPHONE HYDROCHLORIDE 0.5 MG: 1 INJECTION, SOLUTION INTRAMUSCULAR; INTRAVENOUS; SUBCUTANEOUS at 13:08

## 2024-11-06 RX ADMIN — PANTOPRAZOLE SODIUM 40 MG: 40 INJECTION, POWDER, FOR SOLUTION INTRAVENOUS at 21:33

## 2024-11-06 RX ADMIN — BUSPIRONE HYDROCHLORIDE 5 MG: 5 TABLET ORAL at 21:33

## 2024-11-06 RX ADMIN — ONDANSETRON 4 MG: 2 INJECTION, SOLUTION INTRAMUSCULAR; INTRAVENOUS at 10:00

## 2024-11-06 RX ADMIN — HYDROMORPHONE HYDROCHLORIDE 0.5 MG: 1 INJECTION, SOLUTION INTRAMUSCULAR; INTRAVENOUS; SUBCUTANEOUS at 06:25

## 2024-11-06 RX ADMIN — ONDANSETRON 4 MG: 2 INJECTION, SOLUTION INTRAMUSCULAR; INTRAVENOUS at 02:33

## 2024-11-06 RX ADMIN — ONDANSETRON 4 MG: 2 INJECTION, SOLUTION INTRAMUSCULAR; INTRAVENOUS at 16:20

## 2024-11-06 RX ADMIN — TRAZODONE HYDROCHLORIDE 50 MG: 50 TABLET ORAL at 21:33

## 2024-11-06 NOTE — CONSULTS
[facility]  Initial Inpatient Consult Note    Referring Provider: MD Edmar     Reason for Consultation: Pancreatitis    Subjective     History of present illness:    49 y.o. female with a history of anxiety, depression, gastroparesis, chronic pancreatitis who presents to [facility] ED in the setting of abdominal pain with nausea and vomiting.    Patient reports starting yesterday she developed acute epigastric abdominal pain with nausea and vomiting.  She was unable to keep any liquids down.  She denies any hematemesis, hematochezia, melena.  Patient does report a history of pancreatitis, first occurring at the age of 40 when she was celebrating her birthday down in [place].  [place] her pancreatitis was due to alcohol at that time.  She does have a history of a cholecystectomy.  Patient was recently in [facility].  She was drinking alcohol but does not feel like she was drinking a significant amount.  Patient reports she was recently started on a new antidepressant but she stopped that approximately week ago.    Patient follows with Dr. Maldonado outpatient.  She is on Zenpep daily for chronic pancreatitis    CT abdomen pelvis with evidence of acute interstitial edematous pancreatitis, S/P cholecystectomy.    Labs reviewed lipase 2357, amylase 886.      Past Medical History:  Past Medical History:   Diagnosis Date    Anemia     Anxiety     Arthritis mutilans affecting hand     Breast cyst     Depression     Esophageal erosions     Gastroparesis     Hypertension     Labial cyst      Past Surgical History:  Past Surgical History:   Procedure Laterality Date    BLADDER REPAIR      CARDIAC CATHETERIZATION      Ablation    CHOLECYSTECTOMY      D & C HYSTEROSCOPY ENDOMETRIAL ABLATION DIAGNOSTIC LAPAROSCOPY      HAND SURGERY      TUBAL ABDOMINAL LIGATION        Social History:   Social History     Tobacco Use    Smoking status: Former     Types: Cigarettes    Smokeless tobacco: Current   Substance Use Topics     Alcohol use: No      Family History:  Family History   Problem Relation Age of Onset    Diabetes Father         Type 2    Hyperlipidemia Father     Depression Mother     Anemia Mother     Hypertension Mother     Arthritis Paternal Grandfather     Stroke Paternal Grandfather     Alcohol abuse Paternal Grandfather     Hyperlipidemia Paternal Grandfather     Hypertension Paternal Grandfather     Diabetes Paternal Grandfather         Type 2    Arthritis Paternal Grandmother     Hyperlipidemia Paternal Grandmother     Hypertension Paternal Grandmother     Bipolar disorder Paternal Grandmother     Arthritis Maternal Grandmother     Osteoporosis Maternal Grandmother     Arthritis Maternal Grandfather        Home Meds:  Medications Prior to Admission   Medication Sig Dispense Refill Last Dose/Taking    busPIRone (BUSPAR) 5 MG tablet Take 1 tablet by mouth 3 (Three) Times a Day.       dicyclomine (BENTYL) 10 MG capsule Take 1 capsule by mouth 4 (Four) Times a Day As Needed for Abdominal Cramping.       DULoxetine (CYMBALTA) 20 MG capsule Take 1 capsule by mouth Daily.       omeprazole (priLOSEC) 40 MG capsule Take 1 capsule by mouth Daily.       promethazine (PHENERGAN) 12.5 MG tablet Take 1 tablet by mouth Every 6 (Six) Hours As Needed for Nausea or Vomiting.       saccharomyces boulardii (FLORASTOR) 250 MG capsule Take 1 capsule by mouth Daily.       ZENPEP 5000-38077 units capsule delayed-release particles TK 4 CS PO QID .  2      Current Meds:   busPIRone, 5 mg, Oral, TID  Pancrelipase (Lip-Prot-Amyl), 12,000 units of lipase, Oral, TID With Meals  pantoprazole, 40 mg, Intravenous, Q12H  saccharomyces boulardii, 250 mg, Oral, Daily  traZODone, 50 mg, Oral, Nightly      Allergies:  Allergies   Allergen Reactions    Lorazepam Hallucinations, Other (See Comments) and Unknown - High Severity     Other reaction(s): Hallucinations, Unknown    Codeine Hives, Other (See Comments) and Unknown - High Severity     Other reaction(s):  Peeling of skin, Unknown, Unknown       Objective     Vital Signs  Temp:  [96.9 °F (36.1 °C)-98.6 °F (37 °C)] 98.6 °F (37 °C)  Heart Rate:  [] 100  Resp:  [16-18] 16  BP: (120-184)/() 140/94  Physical Exam:  General Appearance:     Alert, cooperative, in no acute distress   Abdomen:     Normal bowel sounds, no masses, no organomegaly, soft     nontender, nondistended, no guarding, no rebound                 tenderness   Rectal:     Deferred       Results Review:   I reviewed the patient's new clinical results.  I reviewed the patient's new imaging results and agree with the interpretation.    Results from last 7 days   Lab Units 11/06/24  0521 11/05/24  1616   WBC 10*3/mm3 11.22* 11.30*   HEMOGLOBIN g/dL 14.1 14.6   HEMATOCRIT % 40.5 40.7   PLATELETS 10*3/mm3 226 227     Results from last 7 days   Lab Units 11/06/24  0521 11/05/24  1616   SODIUM mmol/L 134* 135*   POTASSIUM mmol/L 3.6 3.5   CHLORIDE mmol/L 99 94*   CO2 mmol/L 22.3 23.7   BUN mg/dL 9 7   CREATININE mg/dL 0.71 0.75   CALCIUM mg/dL 8.6 9.7   BILIRUBIN mg/dL 0.9 1.0   ALK PHOS U/L 44 60   ALT (SGPT) U/L 25 34*   AST (SGOT) U/L 30 35*   GLUCOSE mg/dL 106* 148*         Lab Results   Lab Value Date/Time    LIPASE 2,357 (H) 11/06/2024 0521    LIPASE 1,920 (H) 11/05/2024 1616       Radiology:  CT Abdomen Pelvis With Contrast   Final Result   1. Acute interstitial edematous pancreatitis.   2. Post cholecystectomy.   3. Bilateral adrenal nodules are indeterminate in density by   contrast-enhanced CT and can be further evaluated with nonemergent   outpatient noncontrast abdominal CT. Adrenal adenomas would be most   likely.       This report was finalized on 11/5/2024 5:25 PM by Dr. Irineo Villa M.D on Workstation: [id number]              Assessment & Plan   Assessment:   Acute on chronic pancreatitis   Abdominal   Nausea and vomiting   History of CCY   Hx of GERD    Plan:   Acute on chronic pancreatitis likely in the setting of recent  alcohol use.  Continue supportive care with IV fluids and as needed analgesia  Patient on Zenpep outpatient, agree with restarting Creon  N.p.o. for now, depending on clinical status tomorrow can advance diet as tolerated  Consider outpatient MRI in 6 to 8 weeks - she will need outpatient follow-up with her primary GI Dr. Maldonado.    I discussed the patients findings and my recommendations with patient.    Dictated utilizing Dragon dictation.         Arlyn Wong PA-C   [facility]  [address] [place] [address]  Office: [phone]

## 2024-11-06 NOTE — PLAN OF CARE
Problem: Adult Inpatient Plan of Care  Goal: Plan of Care Review  Outcome: Progressing  Flowsheets (Taken 11/6/2024 0927)  Progress: no change  Outcome Evaluation: Patient is alert and oriented x4, up ad lisandra in room, NPO with sips of water with meds. Patient's VSS- HR increases with movement. IVF infusing and PRN pain medication per MAR. Bed in lowest position and call light within reach.  Plan of Care Reviewed With: patient  Goal: Absence of Hospital-Acquired Illness or Injury  Outcome: Progressing  Intervention: Identify and Manage Fall Risk  Recent Flowsheet Documentation  Taken 11/6/2024 0800 by Natalee Colon RN  Safety Promotion/Fall Prevention:   assistive device/personal items within reach   clutter free environment maintained   fall prevention program maintained   lighting adjusted   nonskid shoes/slippers when out of bed   room organization consistent   safety round/check completed   toileting scheduled  Intervention: Prevent Skin Injury  Recent Flowsheet Documentation  Taken 11/6/2024 0800 by Natalee Colon RN  Body Position:   position changed independently   weight shifting   sitting up in bed  Skin Protection: incontinence pads utilized  Intervention: Prevent and Manage VTE (Venous Thromboembolism) Risk  Recent Flowsheet Documentation  Taken 11/6/2024 0800 by Natalee Colon RN  VTE Prevention/Management: (Up ad lisandra)   bilateral   SCDs (sequential compression devices) off   patient refused intervention  Intervention: Prevent Infection  Recent Flowsheet Documentation  Taken 11/6/2024 0800 by Natalee Colon RN  Infection Prevention:   environmental surveillance performed   equipment surfaces disinfected   hand hygiene promoted   personal protective equipment utilized   rest/sleep promoted   single patient room provided  Goal: Optimal Comfort and Wellbeing  Outcome: Progressing  Intervention: Monitor Pain and Promote Comfort  Recent Flowsheet Documentation  Taken 11/6/2024 0800 by Natalee Colon RN  Pain  Management Interventions: (Pain medication available at 0925) pain management plan reviewed with patient/caregiver  Intervention: Provide Person-Centered Care  Recent Flowsheet Documentation  Taken 11/6/2024 0800 by Natalee Colon RN  Trust Relationship/Rapport:   care explained   choices provided   emotional support provided   questions answered   empathic listening provided   questions encouraged   reassurance provided   thoughts/feelings acknowledged  Goal: Readiness for Transition of Care  Outcome: Progressing   Goal Outcome Evaluation:  Plan of Care Reviewed With: patient        Progress: no change  Outcome Evaluation: Patient is alert and oriented x4, up ad lisandra in room, NPO with sips of water with meds. Patient's VSS- HR increases with movement. IVF infusing and PRN pain medication per MAR. Bed in lowest position and call light within reach.

## 2024-11-06 NOTE — NURSING NOTE
Spoke to Dr. Maldonado's office at [phone] and informed that Dr. Maldonado does not come to [place].   Dr. Hyatt notified and GI consult for [place] placed and called.

## 2024-11-06 NOTE — CASE MANAGEMENT/SOCIAL WORK
Discharge Planning Assessment  [facility]     Patient Name: Bushra Henriquez  MRN: [id number]  Today's Date: 11/6/2024    Admit Date: 11/5/2024    Plan: Home with s/o   Discharge Needs Assessment       Row Name 11/06/24 1202       Living Environment    People in Home alone;significant other    Name(s) of People in Home [facility] Seals    Current Living Arrangements home    Potentially Unsafe Housing Conditions none    Primary Care Provided by self    Family Caregiver if Needed significant other    Family Caregiver Names Tl Seals    Quality of Family Relationships helpful;involved    Able to Return to Prior Arrangements yes       Resource/Environmental Concerns    Resource/Environmental Concerns none    Transportation Concerns none       Transition Planning    Patient/Family Anticipates Transition to home with family    Patient/Family Anticipated Services at Transition none    Transportation Anticipated family or friend will provide       Discharge Needs Assessment    Readmission Within the Last 30 Days no previous admission in last 30 days    Equipment Currently Used at Home none    Concerns to be Addressed no discharge needs identified    Anticipated Changes Related to Illness none    Equipment Needed After Discharge none                   Discharge Plan       Row Name 11/06/24 1204       Plan    Plan Home with s/o    Patient/Family in Agreement with Plan yes    Plan Comments Spoke with patient at bedside, introduced self, explained CCP role and verified face sheet and pharmacy information. Pt lives alone but her dtr is living in her house and she has mostly been staying with her s/o [employer] Seals, his house is 1 level and has 2 [address]. She is IADL's, uses no medical equipment, no HH or SNF history. She plans home with s/o to transport no anticipated needs. CCP will follow for needs.  - Radha ESPAÑA                  Continued Care and Services - Admitted Since 11/5/2024    No active coordination exists for this encounter.        Expected Discharge Date and Time       Expected Discharge Date Expected Discharge Time    Nov 8, 2024            Demographic Summary       Row Name 11/06/24 1202       General Information    Admission Type inpatient                   Functional Status       Row Name 11/06/24 1202       Functional Status    Usual Activity Tolerance excellent    Current Activity Tolerance excellent       Assessment of Health Literacy    Health Literacy Excellent       Functional Status, IADL    Medications independent    Meal Preparation independent    Housekeeping independent    Laundry independent    Shopping independent       Mental Status    General Appearance WDL WDL       Mental Status Summary    Recent Changes in Mental Status/Cognitive Functioning no changes                   Psychosocial    No documentation.                  Abuse/Neglect    No documentation.                  Legal       Row Name 11/06/24 1202       Financial/Legal    Who Manages Finances if Patient Unable dtr                   Substance Abuse    No documentation.                  Patient Forms    No documentation.                     Radha Navarro RN

## 2024-11-06 NOTE — PAYOR COMM NOTE
"Abi Henriquez (49 y.o. Female)        PLEASE SEE ATTACHED FOR INPT AUTH.     REF#[id number]    PLEASE CALL  OR  [phone]    THANK YOU    RUBEN LUCAS LPN CCP[   Date of Birth   1975    Social Security Number       Address   [address] [place] [address]    Home Phone   [phone]    MRN   [id number]       Sikhism   Worship    Marital Status                               Admission Date   11/5/24    Admission Type   Emergency    Admitting Provider   Wander Hyatt MD    Attending Provider   Wander Hyatt MD    Department, Room/Bed   [address], [address]/1       Discharge Date       Discharge Disposition       Discharge Destination                                 Attending Provider: Wander Hyatt MD    Allergies: Lorazepam, Codeine    Isolation: None   Infection: None   Code Status: CPR    Ht: 157.5 cm (62.01\")   Wt: 55.2 kg (121 lb 12.8 oz)    Admission Cmt: None   Principal Problem: Acute pancreatitis [K85.90]                   Active Insurance as of 11/5/2024       Primary Coverage       Payor Plan Insurance Group Employer/Plan Group    [employer] BLUE SHIELD PPO [id number]       Payor Plan Address Payor Plan Phone Number Payor Plan Fax Number Effective Dates    [address] [phone]  6/1/2020 - None Entered    [facility] [id number]         Subscriber Name Subscriber Birth Date Member ID       ABI HENRIQUEZ 1975 [id number]                     Emergency Contacts        (Rel.) Home Phone Work Phone Mobile Phone    Shereen Anglin (Daughter) -- -- [phone]              [place]: [facility] [id number]  Tax ID [id number]     History & Physical        Wander Hyatt MD at 11/06/24 0850          History and physical    Primary care physician      Chief complaint  Abdominal pain    History of present illness  49-year-old white female with history of pancreatitis in the past anxiety disorder depression " and chronic pancreatitis presented to [facility] emergency room with severe abdominal pain which radiated to the back associate with nausea but no vomiting diarrhea.  Patient denies any fever chills chest pain shortness of breath.  Patient workup in ER revealed acute on chronic pancreatitis admitted for management.  Patient stated that she drinks alcohol socially and had a drink couple of days ago.    PAST MEDICAL HISTORY   Anemia      Anxiety      Arthritis mutilans affecting hand      Breast cyst      Depression      Esophageal erosions      Gastroparesis      Hypertension      Labial cyst        PAST SURGICAL HISTORY              Procedure Laterality Date    BLADDER REPAIR        CARDIAC CATHETERIZATION         Ablation    CHOLECYSTECTOMY        D & C HYSTEROSCOPY ENDOMETRIAL ABLATION DIAGNOSTIC LAPAROSCOPY        HAND SURGERY        TUBAL ABDOMINAL LIGATION             FAMILY HISTORY           Problem Relation Age of Onset    Diabetes Father           Type 2    Hyperlipidemia Father      Depression Mother      Anemia Mother      Hypertension Mother      Arthritis Paternal Grandfather      Stroke Paternal Grandfather      Alcohol abuse Paternal Grandfather      Hyperlipidemia Paternal Grandfather      Hypertension Paternal Grandfather      Diabetes Paternal Grandfather           Type 2    Arthritis Paternal Grandmother      Hyperlipidemia Paternal Grandmother      Hypertension Paternal Grandmother      Bipolar disorder Paternal Grandmother      Arthritis Maternal Grandmother      Osteoporosis Maternal Grandmother      Arthritis Maternal Grandfather       SOCIAL HISTORY                 Socioeconomic History    Marital status:    Tobacco Use    Smoking status: Former       Types: Cigarettes    Smokeless tobacco: Current   Vaping Use    Vaping status: Never Used   Substance and Sexual Activity    Alcohol use: No    Drug use: No    Sexual activity: Defer       Birth control/protection: None        "  ALLERGIES  Lorazepam and Codeine  Home medications reviewed     REVIEW OF SYSTEMS  All systems reviewed and negative except for those discussed in HPI.     PHYSICAL EXAM   Blood pressure 140/94, pulse 100, temperature 98.6 °F (37 °C), temperature source Oral, resp. rate 16, height 157.5 cm (62.01\"), weight 55.2 kg (121 lb 12.8 oz), SpO2 95%, not currently breastfeeding.    GENERAL: Awake and alert in no distress   SKIN: Warm, dry  HENT: Normocephalic, atraumatic  EYES: no scleral icterus  CV: regular rhythm, regular rate  RESPIRATORY: normal effort, lungs clear  ABDOMEN: soft, mild epigastric tenderness bowel sounds positive  MUSCULOSKELETAL: no deformity  NEURO: alert, moves all extremities, follows commands     LAB RESULTS  Lab Results (last 24 hours)       Procedure Component Value Units Date/Time    Lipase [[id number]]  (Abnormal) Collected: 11/06/24 0521    Specimen: Blood Updated: 11/06/24 0707     Lipase 2,357 U/L     Comprehensive Metabolic Panel [[id number]]  (Abnormal) Collected: 11/06/24 0521    Specimen: Blood Updated: 11/06/24 0701     Glucose 106 mg/dL      BUN 9 mg/dL      Creatinine 0.71 mg/dL      Sodium 134 mmol/L      Potassium 3.6 mmol/L      Comment: Slight hemolysis detected by analyzer. Result may be falsely elevated.        Chloride 99 mmol/L      CO2 22.3 mmol/L      Calcium 8.6 mg/dL      Total Protein 6.5 g/dL      Albumin 3.9 g/dL      ALT (SGPT) 25 U/L      AST (SGOT) 30 U/L      Alkaline Phosphatase 44 U/L      Total Bilirubin 0.9 mg/dL      Globulin 2.6 gm/dL      A/G Ratio 1.5 g/dL      BUN/Creatinine Ratio 12.7     Anion Gap 12.7 mmol/L      eGFR 104.4 mL/min/1.73     Narrative:      GFR Normal >60  Chronic Kidney Disease <60  Kidney Failure <15      Amylase [[id number]]  (Abnormal) Collected: 11/06/24 0521    Specimen: Blood Updated: 11/06/24 0700     Amylase 886 U/L     CBC & Differential [[id number]]  (Abnormal) Collected: 11/06/24 0521    Specimen: Blood Updated: 11/06/24 0623    " Narrative:      The following orders were created for panel order CBC & Differential.  Procedure                               Abnormality         Status                     ---------                               -----------         ------                     CBC Auto Differential[[id number]]        Abnormal            Final result                 Please view results for these tests on the individual orders.    CBC Auto Differential [[id number]]  (Abnormal) Collected: 11/06/24 0521    Specimen: Blood Updated: 11/06/24 0623     WBC 11.22 10*3/mm3      RBC 3.97 10*6/mm3      Hemoglobin 14.1 g/dL      Hematocrit 40.5 %      .0 fL      MCH 35.5 pg      MCHC 34.8 g/dL      RDW 11.5 %      RDW-SD 43.1 fl      MPV 9.4 fL      Platelets 226 10*3/mm3      Neutrophil % 76.8 %      Lymphocyte % 13.4 %      Monocyte % 9.0 %      Eosinophil % 0.2 %      Basophil % 0.2 %      Immature Grans % 0.4 %      Neutrophils, Absolute 8.62 10*3/mm3      Lymphocytes, Absolute 1.50 10*3/mm3      Monocytes, Absolute 1.01 10*3/mm3      Eosinophils, Absolute 0.02 10*3/mm3      Basophils, Absolute 0.02 10*3/mm3      Immature Grans, Absolute 0.05 10*3/mm3      nRBC 0.0 /100 WBC     Urinalysis, Microscopic Only - Urine, Clean Catch [[id number]]  (Abnormal) Collected: 11/05/24 2018    Specimen: Urine, Clean Catch Updated: 11/05/24 2159     RBC, UA 0-2 /HPF      WBC, UA 0-2 /HPF      Comment: Urine culture not indicated.        Bacteria, UA 2+ /HPF      Squamous Epithelial Cells, UA 0-2 /HPF      Hyaline Casts, UA 0-2 /LPF      Methodology Manual Light Microscopy    Urinalysis With Culture If Indicated - Urine, Clean Catch [[id number]]  (Abnormal) Collected: 11/05/24 2018    Specimen: Urine, Clean Catch Updated: 11/05/24 2116     Color, UA Yellow     Appearance, UA Clear     pH, UA 5.5     Specific Gravity, UA >1.030     Glucose, UA Negative     Ketones, UA 40 mg/dL (2+)     Bilirubin, UA Negative     Blood, UA Trace     Protein, UA 30 mg/dL  (1+)     Leuk Esterase, UA Negative     Nitrite, UA Negative     Urobilinogen, UA 0.2 E.U./dL    Narrative:      In absence of clinical symptoms, the presence of pyuria, bacteria, and/or nitrites on the urinalysis result does not correlate with infection.    Blood Culture - Blood, Arm, Right [[id number]] Collected: 11/05/24 1811    Specimen: Blood from Arm, Right Updated: 11/05/24 1815    Blood Culture - Blood, Arm, Left [[id number]] Collected: 11/05/24 1811    Specimen: Blood from Arm, Left Updated: 11/05/24 1815    Comprehensive Metabolic Panel [[id number]]  (Abnormal) Collected: 11/05/24 1616    Specimen: Blood Updated: 11/05/24 1736     Glucose 148 mg/dL      BUN 7 mg/dL      Creatinine 0.75 mg/dL      Sodium 135 mmol/L      Potassium 3.5 mmol/L      Comment: Slight hemolysis detected by analyzer. Result may be falsely elevated.        Chloride 94 mmol/L      CO2 23.7 mmol/L      Calcium 9.7 mg/dL      Total Protein 8.1 g/dL      Albumin 4.6 g/dL      ALT (SGPT) 34 U/L      AST (SGOT) 35 U/L      Comment: Slight hemolysis detected by analyzer. Result may be falsely elevated.        Alkaline Phosphatase 60 U/L      Total Bilirubin 1.0 mg/dL      Globulin 3.5 gm/dL      A/G Ratio 1.3 g/dL      BUN/Creatinine Ratio 9.3     Anion Gap 17.3 mmol/L      eGFR 97.7 mL/min/1.73     Narrative:      GFR Normal >60  Chronic Kidney Disease <60  Kidney Failure <15      Lipase [[id number]]  (Abnormal) Collected: 11/05/24 1616    Specimen: Blood Updated: 11/05/24 1725     Lipase 1,920 U/L     Lactic Acid, Plasma [[id number]]  (Normal) Collected: 11/05/24 1616    Specimen: Blood Updated: 11/05/24 1645     Lactate 1.7 mmol/L     CBC & Differential [[id number]]  (Abnormal) Collected: 11/05/24 1616    Specimen: Blood Updated: 11/05/24 1631    Narrative:      The following orders were created for panel order CBC & Differential.  Procedure                               Abnormality         Status                     ---------                                -----------         ------                     CBC Auto Differential[[id number]]        Abnormal            Final result                 Please view results for these tests on the individual orders.    CBC Auto Differential [[id number]]  (Abnormal) Collected: 11/05/24 1616    Specimen: Blood Updated: 11/05/24 1631     WBC 11.30 10*3/mm3      RBC 4.12 10*6/mm3      Hemoglobin 14.6 g/dL      Hematocrit 40.7 %      MCV 98.8 fL      MCH 35.4 pg      MCHC 35.9 g/dL      RDW 11.5 %      RDW-SD 41.5 fl      MPV 9.2 fL      Platelets 227 10*3/mm3      Neutrophil % 85.9 %      Lymphocyte % 8.1 %      Monocyte % 5.2 %      Eosinophil % 0.1 %      Basophil % 0.3 %      Immature Grans % 0.4 %      Neutrophils, Absolute 9.71 10*3/mm3      Lymphocytes, Absolute 0.92 10*3/mm3      Monocytes, Absolute 0.59 10*3/mm3      Eosinophils, Absolute 0.01 10*3/mm3      Basophils, Absolute 0.03 10*3/mm3      Immature Grans, Absolute 0.04 10*3/mm3      nRBC 0.0 /100 WBC           Imaging Results (Last 24 Hours)       Procedure Component Value Units Date/Time    CT Abdomen Pelvis With Contrast [[id number]] Collected: 11/05/24 1709     Updated: 11/05/24 1728    Narrative:      CT ABDOMEN AND PELVIS WITH IV CONTRAST     HISTORY: Abdominal pain. Pancreatitis.     TECHNIQUE: Radiation dose reduction techniques were utilized, including  automated exposure control and exposure modulation based on body size.   3 mm images were obtained through the abdomen and pelvis after the  administration of IV contrast.     COMPARISON: None available        FINDINGS: Partially visualized lingular calcified granuloma. Diffusely  edematous pancreas parenchyma with marked peripancreatic inflammatory  changes and moderate volume peripancreatic/mesenteric free fluid  layering into the left paracolic gutter (series 2/image 47). Pancreas  parenchyma enhances in its entirety. No pancreatic or peripancreatic  organized fluid collection. Patent splenic artery  and vein. No  pseudoaneurysm. Post cholecystectomy. Nondilated biliary tree. Appendix  is not definitively identifiable from adjacent terminal ileum. No  secondary findings to suggest appendicitis. Bilateral ovarian  physiologic follicles. Bilateral fallopian tube occlusion devices. A 2.8  cm right and 1.5 cm left adrenal nodules measure approximately 30  Hounsfield units (series 2/image 32 and series 2/image 36). Remaining  solid organs and bowel are normal. No abdominal pelvic adenopathy. No  focal osseous abnormality.          Impression:      1. Acute interstitial edematous pancreatitis.  2. Post cholecystectomy.  3. Bilateral adrenal nodules are indeterminate in density by  contrast-enhanced CT and can be further evaluated with nonemergent  outpatient noncontrast abdominal CT. Adrenal adenomas would be most  likely.     This report was finalized on 11/5/2024 5:25 PM by Dr. Irineo Villa M.D on Workstation: [id number]               Current Facility-Administered Medications:     busPIRone (BUSPAR) tablet 5 mg, 5 mg, Oral, TID, Wander Hyatt MD    hydrALAZINE (APRESOLINE) injection 10 mg, 10 mg, Intravenous, Q4H PRN, Wander Hyatt MD, 10 mg at 11/05/24 2034    HYDROmorphone (DILAUDID) injection 0.5 mg, 0.5 mg, Intravenous, Q3H PRN, Wander Hyatt MD, 0.5 mg at 11/06/24 1000    influenza virus vacc split PF FLUZONE 0.5 mL, 0.5 mL, Intramuscular, During Hospitalization, Wander Hyatt MD    ondansetron (ZOFRAN) injection 4 mg, 4 mg, Intravenous, Q4H PRN, Wander Hyatt MD, 4 mg at 11/06/24 1000    pancrelipase (Lip-Prot-Amyl) (CREON) capsule 12,000 units of lipase, 12,000 units of lipase, Oral, TID With Meals, Wander Hyatt MD    pantoprazole (PROTONIX) injection 40 mg, 40 mg, Intravenous, Q12H, Wander Hyatt MD, 40 mg at 11/06/24 0800    saccharomyces boulardii (FLORASTOR) capsule 250 mg, 250 mg, Oral, Daily, Wander Hyatt MD    sodium chloride 0.9 % with KCl 20 mEq/L infusion, 125 mL/hr, Intravenous,  "Continuous, Carmita Hyatt MD, Last Rate: 125 mL/hr at 11/05/24 2041, 125 mL/hr at 11/05/24 2041    traZODone (DESYREL) tablet 50 mg, 50 mg, Oral, Nightly, Carmita Hyatt MD, 50 mg at 11/05/24 2320     ASSESSMENT  Acute on chronic pancreatitis  Anxiety disorder  Depression    PLAN  Admit  IVF  N.p.o.  Symptomatic treatment for pain and nausea  GI consult  Continue home medications  Stress ulcer DVT prophylaxis  Supportive care  Patient is full code  Discussed with nursing staff  Follow closely further recommendation according to hospital course    CARMITA HYATT MD             Electronically signed by Carmita Hyatt MD at 11/06/24 1257          Emergency Department Notes        Da Blackwood RN at 11/05/24 1750          Nursing report ED to floor  Bushra Henriquez  49 y.o.  female    HPI :  HPI  Stated Reason for Visit: pt states increased generalized abd pain starting this morning. Pt states 'it feels like someting popped\"  History Obtained From: patient    Chief Complaint  Chief Complaint   Patient presents with    Abdominal Pain       Admitting doctor:   Carmita Hyatt MD    Admitting diagnosis:   The encounter diagnosis was Idiopathic acute pancreatitis, unspecified complication status.    Code status:   Current Code Status       Date Active Code Status Order ID Comments User Context       Not on file            Allergies:   Lorazepam and Codeine    Isolation:   No active isolations    Intake and Output  No intake or output data in the 24 hours ending 11/05/24 1750    Weight:       11/05/24  1524   Weight: 51.7 kg (113 lb 15.7 oz)       Most recent vitals:   Vitals:    11/05/24 1524 11/05/24 1546 11/05/24 1631   BP:  (!) 165/107 (!) 160/139   Pulse: 91  91   Resp: 16     Temp: 96.9 °F (36.1 °C)     SpO2: 100%     Weight: 51.7 kg (113 lb 15.7 oz)     Height: 157.5 cm (62\")         Active LDAs/IV Access:   Lines, Drains & Airways       Active LDAs       Name Placement date Placement time Site Days    Peripheral IV 11/05/24 " 1617 Right Antecubital 11/05/24  1617  Antecubital  less than 1                    Labs (abnormal labs have a star):   Labs Reviewed   COMPREHENSIVE METABOLIC PANEL - Abnormal; Notable for the following components:       Result Value    Glucose 148 (*)     Sodium 135 (*)     Chloride 94 (*)     ALT (SGPT) 34 (*)     AST (SGOT) 35 (*)     Anion Gap 17.3 (*)     All other components within normal limits    Narrative:     GFR Normal >60  Chronic Kidney Disease <60  Kidney Failure <15     LIPASE - Abnormal; Notable for the following components:    Lipase 1,920 (*)     All other components within normal limits   CBC WITH AUTO DIFFERENTIAL - Abnormal; Notable for the following components:    WBC 11.30 (*)     MCV 98.8 (*)     MCH 35.4 (*)     MCHC 35.9 (*)     RDW 11.5 (*)     Neutrophil % 85.9 (*)     Lymphocyte % 8.1 (*)     Eosinophil % 0.1 (*)     Neutrophils, Absolute 9.71 (*)     All other components within normal limits   LACTIC ACID, PLASMA - Normal   BLOOD CULTURE   BLOOD CULTURE   URINALYSIS W/ CULTURE IF INDICATED   CBC AND DIFFERENTIAL    Narrative:     The following orders were created for panel order CBC & Differential.  Procedure                               Abnormality         Status                     ---------                               -----------         ------                     CBC Auto Differential[[id number]]        Abnormal            Final result                 Please view results for these tests on the individual orders.       EKG:   No orders to display       Meds given in ED:   Medications   piperacillin-tazobactam (ZOSYN) 3.375 g IVPB in 100 mL NS MBP (CD) (has no administration in time range)   diphenhydrAMINE (BENADRYL) injection 12.5 mg (12.5 mg Intravenous Given 11/5/24 1622)   droperidol (INAPSINE) injection 5 mg (5 mg Intravenous Given 11/5/24 1624)   lactated ringers bolus 1,000 mL (1,000 mL Intravenous New Bag 11/5/24 1624)   morphine injection 4 mg (4 mg Intravenous Given  11/5/24 1720)   iopamidol (ISOVUE-300) 61 % injection 100 mL (85 mL Intravenous Given 11/5/24 1701)       Imaging results:  CT Abdomen Pelvis With Contrast    Result Date: 11/5/2024  1. Acute interstitial edematous pancreatitis. 2. Post cholecystectomy. 3. Bilateral adrenal nodules are indeterminate in density by contrast-enhanced CT and can be further evaluated with nonemergent outpatient noncontrast abdominal CT. Adrenal adenomas would be most likely.  This report was finalized on 11/5/2024 5:25 PM by Dr. Irineo Villa M.D on Workstation: [id number]       Ambulatory status:   - assist x 1     Social issues:   Social History     Socioeconomic History    Marital status:    Tobacco Use    Smoking status: Former     Types: Cigarettes    Smokeless tobacco: Current   Vaping Use    Vaping status: Never Used   Substance and Sexual Activity    Alcohol use: No    Drug use: No    Sexual activity: Defer     Birth control/protection: None       Peripheral Neurovascular  Peripheral Neurovascular (Adult)  Peripheral Neurovascular WDL: WDL    Neuro Cognitive  Neuro Cognitive (Adult)  Cognitive/Neuro/Behavioral WDL: WDL    Learning  Learning Assessment  Learning Readiness and Ability: no barriers identified    Respiratory  Respiratory WDL  Respiratory WDL: WDL    Abdominal Pain       Pain Assessments  Pain (Adult)  (0-10) Pain Rating: Rest: 10  (0-10) Pain Rating: Activity: 10    NIH Stroke Scale       Da Balckwood RN  11/05/24 17:50 EST     Electronically signed by Da Blackwood RN at 11/05/24 1750       Rafita Dietrich MD at 11/05/24 1600           EMERGENCY DEPARTMENT ENCOUNTER  Room Number:  17/17  PCP: Brad Hardy MD  Independent Historians: Patient and Family      HPI:  Chief Complaint: had concerns including Abdominal Pain.     A complete HPI/ROS/PMH/PSH/SH/FH are unobtainable due to: None    Chronic or social conditions impacting patient care (Social Determinants of Health):  None      Context: Bushra Henriquez is a 49 y.o. female with a medical history of gastroparesis, reports history of chronic pancreatitis, who presents to the ED c/o acute generalized abdominal pain.  States symptoms started yesterday became severe today.  Generalized abdominal pain associate with nausea and vomiting.  Subjective fever and chills.  Also reports bilateral back pain.  No urinary symptoms.  States feels different than her chronic pancreatitis pain.  No other complaints at this time.      Review of prior external notes (non-ED) -and- Review of prior external test results outside of this encounter:  CT abdomen pelvis 10/8/2024.  Status post cholecystectomy.  No peripancreatic inflammatory change.  No pancreatic atrophy noted.    Prescription drug monitoring program review:         PAST MEDICAL HISTORY  Active Ambulatory Problems     Diagnosis Date Noted    No Active Ambulatory Problems     Resolved Ambulatory Problems     Diagnosis Date Noted    No Resolved Ambulatory Problems     Past Medical History:   Diagnosis Date    Anemia     Anxiety     Arthritis mutilans affecting hand     Breast cyst     Depression     Esophageal erosions     Gastroparesis     Hypertension     Labial cyst          PAST SURGICAL HISTORY  Past Surgical History:   Procedure Laterality Date    BLADDER REPAIR      CARDIAC CATHETERIZATION      Ablation    CHOLECYSTECTOMY      D & C HYSTEROSCOPY ENDOMETRIAL ABLATION DIAGNOSTIC LAPAROSCOPY      HAND SURGERY      TUBAL ABDOMINAL LIGATION           FAMILY HISTORY  Family History   Problem Relation Age of Onset    Diabetes Father         Type 2    Hyperlipidemia Father     Depression Mother     Anemia Mother     Hypertension Mother     Arthritis Paternal Grandfather     Stroke Paternal Grandfather     Alcohol abuse Paternal Grandfather     Hyperlipidemia Paternal Grandfather     Hypertension Paternal Grandfather     Diabetes Paternal Grandfather         Type 2    Arthritis Paternal Grandmother      Hyperlipidemia Paternal Grandmother     Hypertension Paternal Grandmother     Bipolar disorder Paternal Grandmother     Arthritis Maternal Grandmother     Osteoporosis Maternal Grandmother     Arthritis Maternal Grandfather          SOCIAL HISTORY  Social History     Socioeconomic History    Marital status:    Tobacco Use    Smoking status: Former     Types: Cigarettes    Smokeless tobacco: Current   Vaping Use    Vaping status: Never Used   Substance and Sexual Activity    Alcohol use: No    Drug use: No    Sexual activity: Defer     Birth control/protection: None         ALLERGIES  Lorazepam and Codeine      REVIEW OF SYSTEMS  Review of Systems  Included in HPI  All systems reviewed and negative except for those discussed in HPI.      PHYSICAL EXAM    I have reviewed the triage vital signs and nursing notes.    ED Triage Vitals   Temp Heart Rate Resp BP SpO2   11/05/24 1524 11/05/24 1524 11/05/24 1524 11/05/24 1546 11/05/24 1524   96.9 °F (36.1 °C) 91 16 (!) 165/107 100 %      Temp src Heart Rate Source Patient Position BP Location FiO2 (%)   -- -- -- -- --              Physical Exam  GENERAL: alert, uncomfortable appearing  SKIN: Warm, dry  HENT: Normocephalic, atraumatic  EYES: no scleral icterus  CV: regular rhythm, regular rate  RESPIRATORY: normal effort, lungs clear  ABDOMEN: soft, nondistended, normal bowel sounds, minimal tenderness right lower quadrant without rebound, moderate tenderness epigastric and left lower quadrant  MUSCULOSKELETAL: no deformity  NEURO: alert, moves all extremities, follows commands            LAB RESULTS  Recent Results (from the past 24 hours)   Comprehensive Metabolic Panel    Collection Time: 11/05/24  4:16 PM    Specimen: Blood   Result Value Ref Range    Glucose 148 (H) 65 - 99 mg/dL    BUN 7 6 - 20 mg/dL    Creatinine 0.75 0.57 - 1.00 mg/dL    Sodium 135 (L) 136 - 145 mmol/L    Potassium 3.5 3.5 - 5.2 mmol/L    Chloride 94 (L) 98 - 107 mmol/L    CO2 23.7 22.0 -  29.0 mmol/L    Calcium 9.7 8.6 - 10.5 mg/dL    Total Protein 8.1 6.0 - 8.5 g/dL    Albumin 4.6 3.5 - 5.2 g/dL    ALT (SGPT) 34 (H) 1 - 33 U/L    AST (SGOT) 35 (H) 1 - 32 U/L    Alkaline Phosphatase 60 39 - 117 U/L    Total Bilirubin 1.0 0.0 - 1.2 mg/dL    Globulin 3.5 gm/dL    A/G Ratio 1.3 g/dL    BUN/Creatinine Ratio 9.3 7.0 - 25.0    Anion Gap 17.3 (H) 5.0 - 15.0 mmol/L    eGFR 97.7 >60.0 mL/min/1.73   Lipase    Collection Time: 11/05/24  4:16 PM    Specimen: Blood   Result Value Ref Range    Lipase 1,920 (H) 13 - 60 U/L   Lactic Acid, Plasma    Collection Time: 11/05/24  4:16 PM    Specimen: Blood   Result Value Ref Range    Lactate 1.7 0.5 - 2.0 mmol/L   CBC Auto Differential    Collection Time: 11/05/24  4:16 PM    Specimen: Blood   Result Value Ref Range    WBC 11.30 (H) 3.40 - 10.80 10*3/mm3    RBC 4.12 3.77 - 5.28 10*6/mm3    Hemoglobin 14.6 12.0 - 15.9 g/dL    Hematocrit 40.7 34.0 - 46.6 %    MCV 98.8 (H) 79.0 - 97.0 fL    MCH 35.4 (H) 26.6 - 33.0 pg    MCHC 35.9 (H) 31.5 - 35.7 g/dL    RDW 11.5 (L) 12.3 - 15.4 %    RDW-SD 41.5 37.0 - 54.0 fl    MPV 9.2 6.0 - 12.0 fL    Platelets 227 140 - 450 10*3/mm3    Neutrophil % 85.9 (H) 42.7 - 76.0 %    Lymphocyte % 8.1 (L) 19.6 - 45.3 %    Monocyte % 5.2 5.0 - 12.0 %    Eosinophil % 0.1 (L) 0.3 - 6.2 %    Basophil % 0.3 0.0 - 1.5 %    Immature Grans % 0.4 0.0 - 0.5 %    Neutrophils, Absolute 9.71 (H) 1.70 - 7.00 10*3/mm3    Lymphocytes, Absolute 0.92 0.70 - 3.10 10*3/mm3    Monocytes, Absolute 0.59 0.10 - 0.90 10*3/mm3    Eosinophils, Absolute 0.01 0.00 - 0.40 10*3/mm3    Basophils, Absolute 0.03 0.00 - 0.20 10*3/mm3    Immature Grans, Absolute 0.04 0.00 - 0.05 10*3/mm3    nRBC 0.0 0.0 - 0.2 /100 WBC         RADIOLOGY  CT Abdomen Pelvis With Contrast    Result Date: 11/5/2024  CT ABDOMEN AND PELVIS WITH IV CONTRAST  HISTORY: Abdominal pain. Pancreatitis.  TECHNIQUE: Radiation dose reduction techniques were utilized, including automated exposure control and exposure  modulation based on body size. 3 mm images were obtained through the abdomen and pelvis after the administration of IV contrast.  COMPARISON: None available   FINDINGS: Partially visualized lingular calcified granuloma. Diffusely edematous pancreas parenchyma with marked peripancreatic inflammatory changes and moderate volume peripancreatic/mesenteric free fluid layering into the left paracolic gutter (series 2/image 47). Pancreas parenchyma enhances in its entirety. No pancreatic or peripancreatic organized fluid collection. Patent splenic artery and vein. No pseudoaneurysm. Post cholecystectomy. Nondilated biliary tree. Appendix is not definitively identifiable from adjacent terminal ileum. No secondary findings to suggest appendicitis. Bilateral ovarian physiologic follicles. Bilateral fallopian tube occlusion devices. A 2.8 cm right and 1.5 cm left adrenal nodules measure approximately 30 Hounsfield units (series 2/image 32 and series 2/image 36). Remaining solid organs and bowel are normal. No abdominal pelvic adenopathy. No focal osseous abnormality.       1. Acute interstitial edematous pancreatitis. 2. Post cholecystectomy. 3. Bilateral adrenal nodules are indeterminate in density by contrast-enhanced CT and can be further evaluated with nonemergent outpatient noncontrast abdominal CT. Adrenal adenomas would be most likely.  This report was finalized on 11/5/2024 5:25 PM by Dr. Irineo Villa M.D on Workstation: [id number]         MEDICATIONS GIVEN IN ER  Medications   piperacillin-tazobactam (ZOSYN) 3.375 g IVPB in 100 mL NS MBP (CD) (has no administration in time range)   diphenhydrAMINE (BENADRYL) injection 12.5 mg (12.5 mg Intravenous Given 11/5/24 1622)   droperidol (INAPSINE) injection 5 mg (5 mg Intravenous Given 11/5/24 1624)   lactated ringers bolus 1,000 mL (1,000 mL Intravenous New Bag 11/5/24 1624)   morphine injection 4 mg (4 mg Intravenous Given 11/5/24 1720)   iopamidol (ISOVUE-300) 61 %  injection 100 mL (85 mL Intravenous Given 11/5/24 1701)         ORDERS PLACED DURING THIS VISIT:  Orders Placed This Encounter   Procedures    Blood Culture - Blood,    Blood Culture - Blood,    CT Abdomen Pelvis With Contrast    Comprehensive Metabolic Panel    Lipase    Urinalysis With Culture If Indicated - Urine, Clean Catch    Lactic Acid, Plasma    CBC Auto Differential    LIPPS (on-call MD unless specified)    Inpatient Admission    CBC & Differential         OUTPATIENT MEDICATION MANAGEMENT:  Current Facility-Administered Medications Ordered in Epic   Medication Dose Route Frequency Provider Last Rate Last Admin    piperacillin-tazobactam (ZOSYN) 3.375 g IVPB in 100 mL NS MBP (CD)  3.375 g Intravenous Once Rafita Dietrich MD         Current Outpatient Medications Ordered in Epic   Medication Sig Dispense Refill    busPIRone (BUSPAR) 5 MG tablet Take 1 tablet by mouth 3 (Three) Times a Day.      DULoxetine (CYMBALTA) 20 MG capsule Take 1 capsule by mouth Daily.      ZENPEP [id number] units capsule delayed-release particles TK 4 CS PO QID .  2         PROCEDURES  Procedures            PROGRESS, DATA ANALYSIS, CONSULTS, AND MEDICAL DECISION MAKING  All labs have been independently interpreted by me.  All radiology studies have been reviewed by me. All EKG's have been independently viewed and interpreted by me.  Discussion below represents my analysis of pertinent findings related to patient's condition, differential diagnosis, treatment plan and final disposition.    Differential diagnosis includes but is not limited to pancreatitis, gastroparesis, obstruction, gastroenteritis, colitis, diverticulitis, renal colic.    Clinical Scores:                   ED Course as of 11/05/24 1745   Tue Nov 05, 2024   1602 History of chronic pancreatitis, reports feeling bad yesterday but today had severe onset of generalized abdominal pain as well as pain in her back.  Nausea and vomiting.  Subjective fever and chills.   Different from prior chronic pancreatitis.  Will obtain labs, abdominal imaging, give medication for pain and nausea.  Patient and family agreeable with plan [DN]   1634 WBC(!): 11.30 [DN]   1652 Lactate: 1.7 [DN]   1654 Patient still complaining of pain after droperidol.  Will give IV morphine [DN]   1718 CT Abdomen Pelvis With Contrast  Large amount of free fluid in the epigastric region surrounding the pancreas, no significant free air noted.  Will give IV Zosyn [DN]   1731 Lipase(!): 1,920 [DN]   1740 Creatinine: 0.75 [DN]   1740 ALT (SGPT)(!): 34 [DN]   1741 AST (SGOT)(!): 35 [DN]   1741 Total Bilirubin: 1.0 [DN]   1744 I discussed patient with Dr. Aldrich, agreeable with plan to admit [DN]      ED Course User Index  [DN] Rafita Dietrich MD             AS OF 17:45 EST VITALS:    BP - (!) 160/139  HR - 91  TEMP - 96.9 °F (36.1 °C)  O2 SATS - 100%    COMPLEXITY OF CARE  Admission was considered but after careful review of the patient's presentation, physical examination, diagnostic results, and response to treatment the patient may be safely discharged with outpatient follow-up.      DIAGNOSIS  Final diagnoses:   Idiopathic acute pancreatitis, unspecified complication status         DISPOSITION  ED Disposition       ED Disposition   Decision to Admit    Condition   --    Comment   Level of Care: Telemetry [5]   Diagnosis: Acute pancreatitis [577.0.ICD-9-CM]   Admitting Physician: CARMITA ALDRICH [[id number]]   Attending Physician: CARMITA ALDRICH [[id number]]   Certification: I Certify That Inpatient Hospital Services Are Medically Necessary For Greater Than 2 Midnights                  Please note that portions of this document were completed with a voice recognition program.    Note Disclaimer: At [facility], we believe that sharing information builds trust and better relationships. You are receiving this note because you recently visited [facility]. It is possible you will see health information before a provider has  talked with you about it. This kind of information can be easy to misunderstand. To help you fully understand what it means for your health, we urge you to discuss this note with your provider.         Rafita Dietrich MD  11/05/24 1608       Rafita Dietrich MD  11/05/24 1745      Electronically signed by Rafita Dietrich MD at 11/05/24 1745       Oxygen Therapy (since admission)       Date/Time SpO2 Device (Oxygen Therapy) Flow (L/min) (Oxygen Therapy) Oxygen Concentration (%) ETCO2 (mmHg)    11/06/24 0800 -- room air -- -- --    11/06/24 0700 95 room air -- -- --    11/05/24 2323 98 -- -- -- --    11/05/24 2009 98 -- -- -- --    11/05/24 1901 96 -- -- -- --    11/05/24 1831 97 -- -- -- --    11/05/24 1800 96 -- -- -- --    11/05/24 1524 100 -- -- -- --          Intake & Output (last 2 days)         11/04 0701 11/05 0700 11/05 0701 11/06 0700 11/06 0701 11/07 0700    P.O.   0    I.V. (mL/kg)  1250 (22.6)     IV Piggyback  100     Total Intake(mL/kg)  1350 (24.5) 0 (0)    Urine (mL/kg/hr)  300     Total Output  300     Net  +1050 0           Urine Unmeasured Occurrence  1 x           Lines, Drains & Airways       Active LDAs       Name Placement date Placement time Site Days    Peripheral IV 11/06/24 0956 Distal;Posterior;Right Forearm 11/06/24  0956  Forearm  less than 1              Inactive LDAs       Name Placement date Placement time Removal date Removal time Site Days    [REMOVED] Peripheral IV 11/05/24 1617 Right Antecubital 11/05/24  1617  11/06/24  0956  Antecubital  less than 1                All medication doses during the admission are shown, including meds that are no longer on order.  Scheduled Meds Sorted by Name  for Defler, Bushra as of 11/4/24 through 11/6/24    1 Day 3 Days 7 Days 10 Days < Today >   Legend:       Medications 11/04/24 11/05/24 11/06/24   busPIRone (BUSPAR) tablet 5 mg  Dose: 5 mg  Freq: 3 Times Daily Route: PO  Start: 11/06/24 1200   Admin Instructions:         (1962)    1600   2100        diphenhydrAMINE (BENADRYL) injection 12.5 mg  Dose: 12.5 mg  Freq: Once Route: IV  Start: 11/06/24 1200 End: 11/06/24 1101   Admin Instructions:         1101-D/C'd        diphenhydrAMINE (BENADRYL) injection 12.5 mg  Dose: 12.5 mg  Freq: Once Route: IV  Start: 11/05/24 1620 End: 11/05/24 1622   Admin Instructions:        1622           droperidol (INAPSINE) injection 5 mg  Dose: 5 mg  Freq: Once Route: IV  Start: 11/06/24 1200 End: 11/06/24 1101      1101-D/C'd        droperidol (INAPSINE) injection 5 mg  Dose: 5 mg  Freq: Once Route: IV  Start: 11/05/24 1620 End: 11/05/24 1624     1624           iopamidol (ISOVUE-300) 61 % injection 100 mL  Dose: 100 mL  Freq: Once in Imaging Route: IV  Start: 11/06/24 1200 End: 11/06/24 1101      1101-D/C'd        iopamidol (ISOVUE-300) 61 % injection 100 mL  Dose: 100 mL  Freq: Once in Imaging Route: IV  Start: 11/05/24 1717 End: 11/05/24 1701     1701           lactated ringers bolus 1,000 mL  Dose: 1,000 mL  Freq: Once Route: IV  Start: 11/06/24 1200 End: 11/06/24 1101      1101-D/C'd        lactated ringers bolus 1,000 mL  Dose: 1,000 mL  Freq: Once Route: IV  Last Dose: 1,000 mL (11/05/24 1624)  Start: 11/05/24 1620 End: 11/05/24 1724     1624           morphine injection 4 mg  Dose: 4 mg  Freq: Once Route: IV  Start: 11/06/24 1200 End: 11/06/24 1102   Admin Instructions:         1102-D/C'd        morphine injection 4 mg  Dose: 4 mg  Freq: Once Route: IV  Start: 11/05/24 1710 End: 11/05/24 1720   Admin Instructions:        1720           pancrelipase (Lip-Prot-Amyl) (CREON) capsule 12,000 units of lipase  Dose: 12,000 units of lipase  Freq: 3 Times Daily With Meals Route: PO  Start: 11/06/24 1200   Admin Instructions:         [phone]         pantoprazole (PROTONIX) injection 40 mg  Dose: 40 mg  Freq: Every 12 Hours Scheduled Route: IV  Indications of Use: GASTRIC HYPERSECRETORY CONDITIONS  Start: 11/05/24 2100     2034        0800   2100          piperacillin-tazobactam (ZOSYN) 3.375 g IVPB in 100 mL NS MBP (CD)  Dose: 3.375 g  Freq: Once Route: IV  Indications of Use: INTRA-ABDOMINAL INFECTION  Start: 11/06/24 1200 End: 11/06/24 1102      1102-D/C'd        piperacillin-tazobactam (ZOSYN) 3.375 g IVPB in 100 mL NS MBP (CD)  Dose: 3.375 g  Freq: Once Route: IV  Indications of Use: INTRA-ABDOMINAL INFECTION  Last Dose: Stopped (11/05/24 1908)  Start: 11/05/24 1735 End: 11/05/24 1908     1831 [C]   1908          saccharomyces boulardii (FLORASTOR) capsule 250 mg  Dose: 250 mg  Freq: Daily Route: PO  Start: 11/06/24 1200      (1322)          traZODone (DESYREL) tablet 50 mg  Dose: 50 mg  Freq: Nightly Route: PO  Start: 11/05/24 2115   Admin Instructions:        2320 2100                      Continuous Meds Sorted by Name  for Narciso Henriqueza as of 11/4/24 through 11/6/24  Legend:       Medications 11/04/24 11/05/24 11/06/24   sodium chloride 0.9 % with KCl 20 mEq/L infusion  Rate: 125 mL/hr Dose: 125 mL/hr  Freq: Continuous Route: IV  Last Dose: 125 mL/hr (11/05/24 2041)  Start: 11/05/24 2100 End: 11/10/24 2040        2041                             PRN Meds Sorted by Name  for Defler Bushra as of 11/4/24 through 11/6/24  Legend:       Medications 11/04/24 11/05/24 11/06/24   hydrALAZINE (APRESOLINE) injection 10 mg  Dose: 10 mg  Freq: Every 4 Hours PRN Route: IV  PRN Reason: High Blood Pressure  PRN Comment:   Start: 11/05/24 2015   Admin Instructions:        2034           HYDROmorphone (DILAUDID) injection 0.5 mg  Dose: 0.5 mg  Freq: Every 3 Hours PRN Route: IV  PRN Reason: Severe Pain  Start: 11/05/24 2316   Admin Instructions:        2320        0233   0625   1000     1308          HYDROmorphone (DILAUDID) injection 0.5 mg  Dose: 0.5 mg  Freq: Every 4 Hours PRN Route: IV  PRN Reason: Severe Pain  Start: 11/05/24 2010 End: 11/05/24 2316   Admin Instructions:        2034   2316-D/C'd        influenza virus vacc split PF FLUZONE 0.5 mL  Dose: 0.5  mL  Freq: During Hospitalization Route: IM  PRN Reason: Immunization  Start: 11/05/24 2019   Admin Instructions:            ondansetron (ZOFRAN) injection 4 mg  Dose: 4 mg  Freq: Every 4 Hours PRN Route: IV  PRN Reasons: Nausea,Vomiting  Start: 11/05/24 2012     2034        0233   1000

## 2024-11-06 NOTE — H&P
History and physical    Primary care physician      Chief complaint  Abdominal pain    History of present illness  49-year-old white female with history of pancreatitis in the past anxiety disorder depression and chronic pancreatitis presented to [facility] emergency room with severe abdominal pain which radiated to the back associate with nausea but no vomiting diarrhea.  Patient denies any fever chills chest pain shortness of breath.  Patient workup in ER revealed acute on chronic pancreatitis admitted for management.  Patient stated that she drinks alcohol socially and had a drink couple of days ago.    PAST MEDICAL HISTORY   Anemia      Anxiety      Arthritis mutilans affecting hand      Breast cyst      Depression      Esophageal erosions      Gastroparesis      Hypertension      Labial cyst        PAST SURGICAL HISTORY              Procedure Laterality Date    BLADDER REPAIR        CARDIAC CATHETERIZATION         Ablation    CHOLECYSTECTOMY        D & C HYSTEROSCOPY ENDOMETRIAL ABLATION DIAGNOSTIC LAPAROSCOPY        HAND SURGERY        TUBAL ABDOMINAL LIGATION             FAMILY HISTORY           Problem Relation Age of Onset    Diabetes Father           Type 2    Hyperlipidemia Father      Depression Mother      Anemia Mother      Hypertension Mother      Arthritis Paternal Grandfather      Stroke Paternal Grandfather      Alcohol abuse Paternal Grandfather      Hyperlipidemia Paternal Grandfather      Hypertension Paternal Grandfather      Diabetes Paternal Grandfather           Type 2    Arthritis Paternal Grandmother      Hyperlipidemia Paternal Grandmother      Hypertension Paternal Grandmother      Bipolar disorder Paternal Grandmother      Arthritis Maternal Grandmother      Osteoporosis Maternal Grandmother      Arthritis Maternal Grandfather       SOCIAL HISTORY                 Socioeconomic History    Marital status:    Tobacco Use    Smoking status: Former       Types:  "Cigarettes    Smokeless tobacco: Current   Vaping Use    Vaping status: Never Used   Substance and Sexual Activity    Alcohol use: No    Drug use: No    Sexual activity: Defer       Birth control/protection: None         ALLERGIES  Lorazepam and Codeine  Home medications reviewed     REVIEW OF SYSTEMS  All systems reviewed and negative except for those discussed in HPI.     PHYSICAL EXAM   Blood pressure 140/94, pulse 100, temperature 98.6 °F (37 °C), temperature source Oral, resp. rate 16, height 157.5 cm (62.01\"), weight 55.2 kg (121 lb 12.8 oz), SpO2 95%, not currently breastfeeding.    GENERAL: Awake and alert in no distress   SKIN: Warm, dry  HENT: Normocephalic, atraumatic  EYES: no scleral icterus  CV: regular rhythm, regular rate  RESPIRATORY: normal effort, lungs clear  ABDOMEN: soft, mild epigastric tenderness bowel sounds positive  MUSCULOSKELETAL: no deformity  NEURO: alert, moves all extremities, follows commands     LAB RESULTS  Lab Results (last 24 hours)       Procedure Component Value Units Date/Time    Lipase [[id number]]  (Abnormal) Collected: 11/06/24 0521    Specimen: Blood Updated: 11/06/24 0707     Lipase 2,357 U/L     Comprehensive Metabolic Panel [[id number]]  (Abnormal) Collected: 11/06/24 0521    Specimen: Blood Updated: 11/06/24 0701     Glucose 106 mg/dL      BUN 9 mg/dL      Creatinine 0.71 mg/dL      Sodium 134 mmol/L      Potassium 3.6 mmol/L      Comment: Slight hemolysis detected by analyzer. Result may be falsely elevated.        Chloride 99 mmol/L      CO2 22.3 mmol/L      Calcium 8.6 mg/dL      Total Protein 6.5 g/dL      Albumin 3.9 g/dL      ALT (SGPT) 25 U/L      AST (SGOT) 30 U/L      Alkaline Phosphatase 44 U/L      Total Bilirubin 0.9 mg/dL      Globulin 2.6 gm/dL      A/G Ratio 1.5 g/dL      BUN/Creatinine Ratio 12.7     Anion Gap 12.7 mmol/L      eGFR 104.4 mL/min/1.73     Narrative:      GFR Normal >60  Chronic Kidney Disease <60  Kidney Failure <15      Amylase " [[id number]]  (Abnormal) Collected: 11/06/24 0521    Specimen: Blood Updated: 11/06/24 0700     Amylase 886 U/L     CBC & Differential [[id number]]  (Abnormal) Collected: 11/06/24 0521    Specimen: Blood Updated: 11/06/24 0623    Narrative:      The following orders were created for panel order CBC & Differential.  Procedure                               Abnormality         Status                     ---------                               -----------         ------                     CBC Auto Differential[[id number]]        Abnormal            Final result                 Please view results for these tests on the individual orders.    CBC Auto Differential [[id number]]  (Abnormal) Collected: 11/06/24 0521    Specimen: Blood Updated: 11/06/24 0623     WBC 11.22 10*3/mm3      RBC 3.97 10*6/mm3      Hemoglobin 14.1 g/dL      Hematocrit 40.5 %      .0 fL      MCH 35.5 pg      MCHC 34.8 g/dL      RDW 11.5 %      RDW-SD 43.1 fl      MPV 9.4 fL      Platelets 226 10*3/mm3      Neutrophil % 76.8 %      Lymphocyte % 13.4 %      Monocyte % 9.0 %      Eosinophil % 0.2 %      Basophil % 0.2 %      Immature Grans % 0.4 %      Neutrophils, Absolute 8.62 10*3/mm3      Lymphocytes, Absolute 1.50 10*3/mm3      Monocytes, Absolute 1.01 10*3/mm3      Eosinophils, Absolute 0.02 10*3/mm3      Basophils, Absolute 0.02 10*3/mm3      Immature Grans, Absolute 0.05 10*3/mm3      nRBC 0.0 /100 WBC     Urinalysis, Microscopic Only - Urine, Clean Catch [[id number]]  (Abnormal) Collected: 11/05/24 2018    Specimen: Urine, Clean Catch Updated: 11/05/24 2159     RBC, UA 0-2 /HPF      WBC, UA 0-2 /HPF      Comment: Urine culture not indicated.        Bacteria, UA 2+ /HPF      Squamous Epithelial Cells, UA 0-2 /HPF      Hyaline Casts, UA 0-2 /LPF      Methodology Manual Light Microscopy    Urinalysis With Culture If Indicated - Urine, Clean Catch [[id number]]  (Abnormal) Collected: 11/05/24 2018    Specimen: Urine, Clean Catch Updated: 11/05/24  2116     Color, UA Yellow     Appearance, UA Clear     pH, UA 5.5     Specific Gravity, UA >1.030     Glucose, UA Negative     Ketones, UA 40 mg/dL (2+)     Bilirubin, UA Negative     Blood, UA Trace     Protein, UA 30 mg/dL (1+)     Leuk Esterase, UA Negative     Nitrite, UA Negative     Urobilinogen, UA 0.2 E.U./dL    Narrative:      In absence of clinical symptoms, the presence of pyuria, bacteria, and/or nitrites on the urinalysis result does not correlate with infection.    Blood Culture - Blood, Arm, Right [[id number]] Collected: 11/05/24 1811    Specimen: Blood from Arm, Right Updated: 11/05/24 1815    Blood Culture - Blood, Arm, Left [[id number]] Collected: 11/05/24 1811    Specimen: Blood from Arm, Left Updated: 11/05/24 1815    Comprehensive Metabolic Panel [[id number]]  (Abnormal) Collected: 11/05/24 1616    Specimen: Blood Updated: 11/05/24 1736     Glucose 148 mg/dL      BUN 7 mg/dL      Creatinine 0.75 mg/dL      Sodium 135 mmol/L      Potassium 3.5 mmol/L      Comment: Slight hemolysis detected by analyzer. Result may be falsely elevated.        Chloride 94 mmol/L      CO2 23.7 mmol/L      Calcium 9.7 mg/dL      Total Protein 8.1 g/dL      Albumin 4.6 g/dL      ALT (SGPT) 34 U/L      AST (SGOT) 35 U/L      Comment: Slight hemolysis detected by analyzer. Result may be falsely elevated.        Alkaline Phosphatase 60 U/L      Total Bilirubin 1.0 mg/dL      Globulin 3.5 gm/dL      A/G Ratio 1.3 g/dL      BUN/Creatinine Ratio 9.3     Anion Gap 17.3 mmol/L      eGFR 97.7 mL/min/1.73     Narrative:      GFR Normal >60  Chronic Kidney Disease <60  Kidney Failure <15      Lipase [[id number]]  (Abnormal) Collected: 11/05/24 1616    Specimen: Blood Updated: 11/05/24 1725     Lipase 1,920 U/L     Lactic Acid, Plasma [[id number]]  (Normal) Collected: 11/05/24 1616    Specimen: Blood Updated: 11/05/24 1645     Lactate 1.7 mmol/L     CBC & Differential [[id number]]  (Abnormal) Collected: 11/05/24 1616    Specimen: Blood  Updated: 11/05/24 1631    Narrative:      The following orders were created for panel order CBC & Differential.  Procedure                               Abnormality         Status                     ---------                               -----------         ------                     CBC Auto Differential[[id number]]        Abnormal            Final result                 Please view results for these tests on the individual orders.    CBC Auto Differential [[id number]]  (Abnormal) Collected: 11/05/24 1616    Specimen: Blood Updated: 11/05/24 1631     WBC 11.30 10*3/mm3      RBC 4.12 10*6/mm3      Hemoglobin 14.6 g/dL      Hematocrit 40.7 %      MCV 98.8 fL      MCH 35.4 pg      MCHC 35.9 g/dL      RDW 11.5 %      RDW-SD 41.5 fl      MPV 9.2 fL      Platelets 227 10*3/mm3      Neutrophil % 85.9 %      Lymphocyte % 8.1 %      Monocyte % 5.2 %      Eosinophil % 0.1 %      Basophil % 0.3 %      Immature Grans % 0.4 %      Neutrophils, Absolute 9.71 10*3/mm3      Lymphocytes, Absolute 0.92 10*3/mm3      Monocytes, Absolute 0.59 10*3/mm3      Eosinophils, Absolute 0.01 10*3/mm3      Basophils, Absolute 0.03 10*3/mm3      Immature Grans, Absolute 0.04 10*3/mm3      nRBC 0.0 /100 WBC           Imaging Results (Last 24 Hours)       Procedure Component Value Units Date/Time    CT Abdomen Pelvis With Contrast [[id number]] Collected: 11/05/24 1709     Updated: 11/05/24 1728    Narrative:      CT ABDOMEN AND PELVIS WITH IV CONTRAST     HISTORY: Abdominal pain. Pancreatitis.     TECHNIQUE: Radiation dose reduction techniques were utilized, including  automated exposure control and exposure modulation based on body size.   3 mm images were obtained through the abdomen and pelvis after the  administration of IV contrast.     COMPARISON: None available        FINDINGS: Partially visualized lingular calcified granuloma. Diffusely  edematous pancreas parenchyma with marked peripancreatic inflammatory  changes and moderate volume  peripancreatic/mesenteric free fluid  layering into the left paracolic gutter (series 2/image 47). Pancreas  parenchyma enhances in its entirety. No pancreatic or peripancreatic  organized fluid collection. Patent splenic artery and vein. No  pseudoaneurysm. Post cholecystectomy. Nondilated biliary tree. Appendix  is not definitively identifiable from adjacent terminal ileum. No  secondary findings to suggest appendicitis. Bilateral ovarian  physiologic follicles. Bilateral fallopian tube occlusion devices. A 2.8  cm right and 1.5 cm left adrenal nodules measure approximately 30  Hounsfield units (series 2/image 32 and series 2/image 36). Remaining  solid organs and bowel are normal. No abdominal pelvic adenopathy. No  focal osseous abnormality.          Impression:      1. Acute interstitial edematous pancreatitis.  2. Post cholecystectomy.  3. Bilateral adrenal nodules are indeterminate in density by  contrast-enhanced CT and can be further evaluated with nonemergent  outpatient noncontrast abdominal CT. Adrenal adenomas would be most  likely.     This report was finalized on 11/5/2024 5:25 PM by Dr. Irineo Villa M.D on Workstation: [id number]               Current Facility-Administered Medications:     busPIRone (BUSPAR) tablet 5 mg, 5 mg, Oral, TID, Wander Hyatt MD    hydrALAZINE (APRESOLINE) injection 10 mg, 10 mg, Intravenous, Q4H PRN, Wander Hyatt MD, 10 mg at 11/05/24 2034    HYDROmorphone (DILAUDID) injection 0.5 mg, 0.5 mg, Intravenous, Q3H PRN, Wander Hyatt MD, 0.5 mg at 11/06/24 1000    influenza virus vacc split PF FLUZONE 0.5 mL, 0.5 mL, Intramuscular, During Hospitalization, Wander Hyatt MD    ondansetron (ZOFRAN) injection 4 mg, 4 mg, Intravenous, Q4H PRN, Wander Hyatt MD, 4 mg at 11/06/24 1000    pancrelipase (Lip-Prot-Amyl) (CREON) capsule 12,000 units of lipase, 12,000 units of lipase, Oral, TID With Meals, Wander Hyatt MD    pantoprazole (PROTONIX) injection 40 mg, 40 mg,  Intravenous, Q12H, Carmita Hyatt MD, 40 mg at 11/06/24 0800    saccharomyces boulardii (FLORASTOR) capsule 250 mg, 250 mg, Oral, Daily, Carmita Hyatt MD    sodium chloride 0.9 % with KCl 20 mEq/L infusion, 125 mL/hr, Intravenous, Continuous, Carmita Hyatt MD, Last Rate: 125 mL/hr at 11/05/24 2041, 125 mL/hr at 11/05/24 2041    traZODone (DESYREL) tablet 50 mg, 50 mg, Oral, Nightly, Carmita Hyatt MD, 50 mg at 11/05/24 2320     ASSESSMENT  Acute on chronic pancreatitis  Anxiety disorder  Depression    PLAN  Admit  IVF  N.p.o.  Symptomatic treatment for pain and nausea  GI consult  Continue home medications  Stress ulcer DVT prophylaxis  Supportive care  Patient is full code  Discussed with nursing staff  Follow closely further recommendation according to hospital course    CARMITA HYATT MD

## 2024-11-06 NOTE — PLAN OF CARE
Goal Outcome Evaluation:  Plan of Care Reviewed With: patient        Progress: no change  Outcome Evaluation: New admit for pancreatitis.  Up ad lisandra in room.  Pain med per MAR.  Npo x ice/meds.

## 2024-11-07 LAB
ALBUMIN SERPL-MCNC: 3.4 G/DL (ref 3.5–5.2)
ALBUMIN/GLOB SERPL: 1.5 G/DL
ALP SERPL-CCNC: 54 U/L (ref 39–117)
ALT SERPL W P-5'-P-CCNC: 19 U/L (ref 1–33)
ANION GAP SERPL CALCULATED.3IONS-SCNC: 10.6 MMOL/L (ref 5–15)
AST SERPL-CCNC: 28 U/L (ref 1–32)
BASOPHILS # BLD AUTO: 0.03 10*3/MM3 (ref 0–0.2)
BASOPHILS NFR BLD AUTO: 0.3 % (ref 0–1.5)
BILIRUB SERPL-MCNC: 0.7 MG/DL (ref 0–1.2)
BUN SERPL-MCNC: 3 MG/DL (ref 6–20)
BUN/CREAT SERPL: 6.1 (ref 7–25)
CALCIUM SPEC-SCNC: 8 MG/DL (ref 8.6–10.5)
CHLORIDE SERPL-SCNC: 99 MMOL/L (ref 98–107)
CHOLEST SERPL-MCNC: 168 MG/DL (ref 0–200)
CO2 SERPL-SCNC: 22.4 MMOL/L (ref 22–29)
CREAT SERPL-MCNC: 0.49 MG/DL (ref 0.57–1)
DEPRECATED RDW RBC AUTO: 41.2 FL (ref 37–54)
EGFRCR SERPLBLD CKD-EPI 2021: 115.7 ML/MIN/1.73
EOSINOPHIL # BLD AUTO: 0.15 10*3/MM3 (ref 0–0.4)
EOSINOPHIL NFR BLD AUTO: 1.7 % (ref 0.3–6.2)
ERYTHROCYTE [DISTWIDTH] IN BLOOD BY AUTOMATED COUNT: 11.3 % (ref 12.3–15.4)
GLOBULIN UR ELPH-MCNC: 2.3 GM/DL
GLUCOSE SERPL-MCNC: 69 MG/DL (ref 65–99)
HBA1C MFR BLD: 4.8 % (ref 4.8–5.6)
HCT VFR BLD AUTO: 33.9 % (ref 34–46.6)
HDLC SERPL-MCNC: 53 MG/DL (ref 40–60)
HGB BLD-MCNC: 11.5 G/DL (ref 12–15.9)
IMM GRANULOCYTES # BLD AUTO: 0.02 10*3/MM3 (ref 0–0.05)
IMM GRANULOCYTES NFR BLD AUTO: 0.2 % (ref 0–0.5)
LDLC SERPL CALC-MCNC: 95 MG/DL (ref 0–100)
LDLC/HDLC SERPL: 1.75 {RATIO}
LIPASE SERPL-CCNC: 1309 U/L (ref 13–60)
LYMPHOCYTES # BLD AUTO: 1.28 10*3/MM3 (ref 0.7–3.1)
LYMPHOCYTES NFR BLD AUTO: 14.4 % (ref 19.6–45.3)
MCH RBC QN AUTO: 34.4 PG (ref 26.6–33)
MCHC RBC AUTO-ENTMCNC: 33.9 G/DL (ref 31.5–35.7)
MCV RBC AUTO: 101.5 FL (ref 79–97)
MONOCYTES # BLD AUTO: 0.76 10*3/MM3 (ref 0.1–0.9)
MONOCYTES NFR BLD AUTO: 8.6 % (ref 5–12)
NEUTROPHILS NFR BLD AUTO: 6.62 10*3/MM3 (ref 1.7–7)
NEUTROPHILS NFR BLD AUTO: 74.8 % (ref 42.7–76)
NRBC BLD AUTO-RTO: 0 /100 WBC (ref 0–0.2)
NT-PROBNP SERPL-MCNC: 469 PG/ML (ref 0–450)
PLATELET # BLD AUTO: 162 10*3/MM3 (ref 140–450)
PMV BLD AUTO: 9.5 FL (ref 6–12)
POTASSIUM SERPL-SCNC: 3.5 MMOL/L (ref 3.5–5.2)
PROT SERPL-MCNC: 5.7 G/DL (ref 6–8.5)
RBC # BLD AUTO: 3.34 10*6/MM3 (ref 3.77–5.28)
SODIUM SERPL-SCNC: 132 MMOL/L (ref 136–145)
TRIGL SERPL-MCNC: 111 MG/DL (ref 0–150)
TSH SERPL DL<=0.05 MIU/L-ACNC: 3.45 UIU/ML (ref 0.27–4.2)
VLDLC SERPL-MCNC: 20 MG/DL (ref 5–40)
WBC NRBC COR # BLD AUTO: 8.86 10*3/MM3 (ref 3.4–10.8)

## 2024-11-07 PROCEDURE — 83036 HEMOGLOBIN GLYCOSYLATED A1C: CPT | Performed by: HOSPITALIST

## 2024-11-07 PROCEDURE — 80050 GENERAL HEALTH PANEL: CPT | Performed by: HOSPITALIST

## 2024-11-07 PROCEDURE — 25010000002 SODIUM CHLORIDE 0.9 % WITH KCL 20 MEQ 20-0.9 MEQ/L-% SOLUTION: Performed by: HOSPITALIST

## 2024-11-07 PROCEDURE — 83690 ASSAY OF LIPASE: CPT | Performed by: HOSPITALIST

## 2024-11-07 PROCEDURE — 99232 SBSQ HOSP IP/OBS MODERATE 35: CPT | Performed by: PHYSICIAN ASSISTANT

## 2024-11-07 PROCEDURE — 25010000002 HYDROMORPHONE PER 4 MG: Performed by: HOSPITALIST

## 2024-11-07 PROCEDURE — 83880 ASSAY OF NATRIURETIC PEPTIDE: CPT | Performed by: HOSPITALIST

## 2024-11-07 PROCEDURE — 25010000002 ONDANSETRON PER 1 MG: Performed by: HOSPITALIST

## 2024-11-07 PROCEDURE — 80061 LIPID PANEL: CPT | Performed by: HOSPITALIST

## 2024-11-07 RX ORDER — DICYCLOMINE HYDROCHLORIDE 10 MG/1
10 CAPSULE ORAL 4 TIMES DAILY PRN
Status: DISCONTINUED | OUTPATIENT
Start: 2024-11-07 | End: 2024-11-10

## 2024-11-07 RX ORDER — AMOXICILLIN 250 MG
2 CAPSULE ORAL DAILY
Status: DISCONTINUED | OUTPATIENT
Start: 2024-11-07 | End: 2024-11-10 | Stop reason: HOSPADM

## 2024-11-07 RX ORDER — SIMETHICONE 80 MG
80 TABLET,CHEWABLE ORAL 3 TIMES DAILY PRN
Status: DISCONTINUED | OUTPATIENT
Start: 2024-11-07 | End: 2024-11-10

## 2024-11-07 RX ORDER — POLYETHYLENE GLYCOL 3350 17 G/17G
17 POWDER, FOR SOLUTION ORAL DAILY
Status: DISCONTINUED | OUTPATIENT
Start: 2024-11-07 | End: 2024-11-10 | Stop reason: HOSPADM

## 2024-11-07 RX ADMIN — HYDROMORPHONE HYDROCHLORIDE 0.5 MG: 1 INJECTION, SOLUTION INTRAMUSCULAR; INTRAVENOUS; SUBCUTANEOUS at 11:45

## 2024-11-07 RX ADMIN — POTASSIUM CHLORIDE AND SODIUM CHLORIDE 125 ML/HR: 900; 150 INJECTION, SOLUTION INTRAVENOUS at 08:30

## 2024-11-07 RX ADMIN — HYDROMORPHONE HYDROCHLORIDE 0.5 MG: 1 INJECTION, SOLUTION INTRAMUSCULAR; INTRAVENOUS; SUBCUTANEOUS at 20:45

## 2024-11-07 RX ADMIN — HYDROMORPHONE HYDROCHLORIDE 0.5 MG: 1 INJECTION, SOLUTION INTRAMUSCULAR; INTRAVENOUS; SUBCUTANEOUS at 05:19

## 2024-11-07 RX ADMIN — ONDANSETRON 4 MG: 2 INJECTION, SOLUTION INTRAMUSCULAR; INTRAVENOUS at 05:20

## 2024-11-07 RX ADMIN — BUSPIRONE HYDROCHLORIDE 5 MG: 5 TABLET ORAL at 08:27

## 2024-11-07 RX ADMIN — SENNOSIDES AND DOCUSATE SODIUM 2 TABLET: 50; 8.6 TABLET ORAL at 11:42

## 2024-11-07 RX ADMIN — BUSPIRONE HYDROCHLORIDE 5 MG: 5 TABLET ORAL at 16:40

## 2024-11-07 RX ADMIN — PANTOPRAZOLE SODIUM 40 MG: 40 INJECTION, POWDER, FOR SOLUTION INTRAVENOUS at 20:45

## 2024-11-07 RX ADMIN — BUSPIRONE HYDROCHLORIDE 5 MG: 5 TABLET ORAL at 20:45

## 2024-11-07 RX ADMIN — HYDROMORPHONE HYDROCHLORIDE 0.5 MG: 1 INJECTION, SOLUTION INTRAMUSCULAR; INTRAVENOUS; SUBCUTANEOUS at 08:37

## 2024-11-07 RX ADMIN — TRAZODONE HYDROCHLORIDE 50 MG: 50 TABLET ORAL at 20:45

## 2024-11-07 RX ADMIN — POTASSIUM CHLORIDE AND SODIUM CHLORIDE 125 ML/HR: 900; 150 INJECTION, SOLUTION INTRAVENOUS at 00:34

## 2024-11-07 RX ADMIN — ONDANSETRON 4 MG: 2 INJECTION, SOLUTION INTRAMUSCULAR; INTRAVENOUS at 08:37

## 2024-11-07 RX ADMIN — PANCRELIPASE 12000 UNITS OF LIPASE: 60000; 12000; 38000 CAPSULE, DELAYED RELEASE PELLETS ORAL at 08:27

## 2024-11-07 RX ADMIN — DICYCLOMINE HYDROCHLORIDE 10 MG: 10 CAPSULE ORAL at 16:40

## 2024-11-07 RX ADMIN — PANTOPRAZOLE SODIUM 40 MG: 40 INJECTION, POWDER, FOR SOLUTION INTRAVENOUS at 08:28

## 2024-11-07 RX ADMIN — Medication 250 MG: at 08:28

## 2024-11-07 RX ADMIN — HYDROMORPHONE HYDROCHLORIDE 0.5 MG: 1 INJECTION, SOLUTION INTRAMUSCULAR; INTRAVENOUS; SUBCUTANEOUS at 02:13

## 2024-11-07 RX ADMIN — PANCRELIPASE 12000 UNITS OF LIPASE: 60000; 12000; 38000 CAPSULE, DELAYED RELEASE PELLETS ORAL at 11:42

## 2024-11-07 RX ADMIN — POTASSIUM CHLORIDE AND SODIUM CHLORIDE 125 ML/HR: 900; 150 INJECTION, SOLUTION INTRAVENOUS at 18:39

## 2024-11-07 RX ADMIN — HYDROMORPHONE HYDROCHLORIDE 0.5 MG: 1 INJECTION, SOLUTION INTRAMUSCULAR; INTRAVENOUS; SUBCUTANEOUS at 16:40

## 2024-11-07 RX ADMIN — POLYETHYLENE GLYCOL 3350 17 G: 17 POWDER, FOR SOLUTION ORAL at 11:42

## 2024-11-07 RX ADMIN — PANCRELIPASE 12000 UNITS OF LIPASE: 60000; 12000; 38000 CAPSULE, DELAYED RELEASE PELLETS ORAL at 16:41

## 2024-11-07 NOTE — PROGRESS NOTES
"Daily progress note    Primary care physician      Subjective  Awake and alert and feeling better than yesterday with no new complaint but still with abdominal pain nausea no vomiting diarrhea    History of present illness  49-year-old white female with history of pancreatitis in the past anxiety disorder depression and chronic pancreatitis presented to [facility] [place] emergency room with severe abdominal pain which radiated to the back associate with nausea but no vomiting diarrhea.  Patient denies any fever chills chest pain shortness of breath.  Patient workup in ER revealed acute on chronic pancreatitis admitted for management.  Patient stated that she drinks alcohol socially and had a drink couple of days ago.    REVIEW OF SYSTEMS  All systems reviewed and negative except for those discussed in HPI.     PHYSICAL EXAM   Blood pressure 132/89, pulse 100, temperature 98.6 °F (37 °C), temperature source Oral, resp. rate 18, height 157.5 cm (62.01\"), weight 55.2 kg (121 lb 12.8 oz), SpO2 99%, not currently breastfeeding.    GENERAL: Awake and alert in no distress   SKIN: Warm, dry  HENT: Normocephalic, atraumatic  EYES: no scleral icterus  CV: regular rhythm, regular rate  RESPIRATORY: normal effort, lungs clear  ABDOMEN: soft, mild epigastric tenderness bowel sounds positive  MUSCULOSKELETAL: no deformity  NEURO: alert, moves all extremities, follows commands     LAB RESULTS  Lab Results (last 24 hours)       Procedure Component Value Units Date/Time    Lipase [[id number]]  (Abnormal) Collected: 11/07/24 0529    Specimen: Blood Updated: 11/07/24 0718     Lipase 1,309 U/L     BNP [[id number]]  (Abnormal) Collected: 11/07/24 0529    Specimen: Blood Updated: 11/07/24 0716     proBNP 469.0 pg/mL     Narrative:      This assay is used as an aid in the diagnosis of individuals suspected of having heart failure. It can be used as an aid in the diagnosis of acute decompensated heart failure (ADHF) in " patients presenting with signs and symptoms of ADHF to the emergency department (ED). In addition, NT-proBNP of <300 pg/mL indicates ADHF is not likely.    Age Range Result Interpretation  NT-proBNP Concentration (pg/mL:      <50             Positive            >450                   Gray                 300-450                    Negative             <300    50-75           Positive            >900                  Gray                300-900                  Negative            <300      >75             Positive            >1800                  Gray                300-1800                  Negative            <300    TSH [[id number]]  (Normal) Collected: 11/07/24 0529    Specimen: Blood Updated: 11/07/24 0716     TSH 3.450 uIU/mL     Comprehensive Metabolic Panel [[id number]]  (Abnormal) Collected: 11/07/24 0529    Specimen: Blood Updated: 11/07/24 0712     Glucose 69 mg/dL      BUN 3 mg/dL      Creatinine 0.49 mg/dL      Sodium 132 mmol/L      Potassium 3.5 mmol/L      Chloride 99 mmol/L      CO2 22.4 mmol/L      Calcium 8.0 mg/dL      Total Protein 5.7 g/dL      Albumin 3.4 g/dL      ALT (SGPT) 19 U/L      AST (SGOT) 28 U/L      Alkaline Phosphatase 54 U/L      Total Bilirubin 0.7 mg/dL      Globulin 2.3 gm/dL      A/G Ratio 1.5 g/dL      BUN/Creatinine Ratio 6.1     Anion Gap 10.6 mmol/L      eGFR 115.7 mL/min/1.73     Narrative:      GFR Normal >60  Chronic Kidney Disease <60  Kidney Failure <15      Lipid Panel [[id number]] Collected: 11/07/24 0529    Specimen: Blood Updated: 11/07/24 0710     Total Cholesterol 168 mg/dL      Triglycerides 111 mg/dL      HDL Cholesterol 53 mg/dL      LDL Cholesterol  95 mg/dL      VLDL Cholesterol 20 mg/dL      LDL/HDL Ratio 1.75    Narrative:      Cholesterol Reference Ranges  (U.S. Department of Health and Human Services ATP III Classifications)    Desirable          <200 mg/dL  Borderline High    200-239 mg/dL  High Risk          >240 mg/dL      Triglyceride Reference  Ranges  (U.S. Department of Health and Human Services ATP III Classifications)    Normal           <150 mg/dL  Borderline High  150-199 mg/dL  High             200-499 mg/dL  Very High        >500 mg/dL    HDL Reference Ranges  (U.S. Department of Health and Human Services ATP III Classifications)    Low     <40 mg/dl (major risk factor for CHD)  High    >60 mg/dl ('negative' risk factor for CHD)        LDL Reference Ranges  (U.S. Department of Health and Human Services ATP III Classifications)    Optimal          <100 mg/dL  Near Optimal     100-129 mg/dL  Borderline High  130-159 mg/dL  High             160-189 mg/dL  Very High        >189 mg/dL    CBC & Differential [[id number]]  (Abnormal) Collected: 11/07/24 0529    Specimen: Blood Updated: 11/07/24 0706    Narrative:      The following orders were created for panel order CBC & Differential.  Procedure                               Abnormality         Status                     ---------                               -----------         ------                     CBC Auto Differential[[id number]]        Abnormal            Final result                 Please view results for these tests on the individual orders.    CBC Auto Differential [[id number]]  (Abnormal) Collected: 11/07/24 0529    Specimen: Blood Updated: 11/07/24 0706     WBC 8.86 10*3/mm3      RBC 3.34 10*6/mm3      Hemoglobin 11.5 g/dL      Hematocrit 33.9 %      .5 fL      MCH 34.4 pg      MCHC 33.9 g/dL      RDW 11.3 %      RDW-SD 41.2 fl      MPV 9.5 fL      Platelets 162 10*3/mm3      Neutrophil % 74.8 %      Lymphocyte % 14.4 %      Monocyte % 8.6 %      Eosinophil % 1.7 %      Basophil % 0.3 %      Immature Grans % 0.2 %      Neutrophils, Absolute 6.62 10*3/mm3      Lymphocytes, Absolute 1.28 10*3/mm3      Monocytes, Absolute 0.76 10*3/mm3      Eosinophils, Absolute 0.15 10*3/mm3      Basophils, Absolute 0.03 10*3/mm3      Immature Grans, Absolute 0.02 10*3/mm3      nRBC 0.0 /100 WBC      Hemoglobin A1c [[id number]]  (Normal) Collected: 11/07/24 0529    Specimen: Blood Updated: 11/07/24 0700     Hemoglobin A1C 4.80 %     Narrative:      Hemoglobin A1C Ranges:    Increased Risk for Diabetes  5.7% to 6.4%  Diabetes                     >= 6.5%  Diabetic Goal                < 7.0%    Blood Culture - Blood, Arm, Right [[id number]]  (Normal) Collected: 11/05/24 1811    Specimen: Blood from Arm, Right Updated: 11/06/24 1831     Blood Culture No growth at 24 hours    Narrative:      Less than seven (7) mL's of blood was collected.  Insufficient quantity may yield false negative results.    Blood Culture - Blood, Arm, Left [[id number]]  (Normal) Collected: 11/05/24 1811    Specimen: Blood from Arm, Left Updated: 11/06/24 1831     Blood Culture No growth at 24 hours    Narrative:      Less than seven (7) mL's of blood was collected.  Insufficient quantity may yield false negative results.          Imaging Results (Last 24 Hours)       ** No results found for the last 24 hours. **            Current Facility-Administered Medications:     busPIRone (BUSPAR) tablet 5 mg, 5 mg, Oral, TID, Wander Hyatt MD, 5 mg at 11/07/24 0827    dicyclomine (BENTYL) capsule 10 mg, 10 mg, Oral, 4x Daily PRN, Arlyn Wong PA-C    hydrALAZINE (APRESOLINE) injection 10 mg, 10 mg, Intravenous, Q4H PRN, Wander Hyatt MD, 10 mg at 11/05/24 2034    HYDROmorphone (DILAUDID) injection 0.5 mg, 0.5 mg, Intravenous, Q3H PRN, Wander Hyatt MD, 0.5 mg at 11/07/24 1145    influenza virus vacc split PF FLUZONE 0.5 mL, 0.5 mL, Intramuscular, During Hospitalization, Wander Hyatt MD    ondansetron (ZOFRAN) injection 4 mg, 4 mg, Intravenous, Q4H PRN, Wander Hyatt MD, 4 mg at 11/07/24 0837    pancrelipase (Lip-Prot-Amyl) (CREON) capsule 12,000 units of lipase, 12,000 units of lipase, Oral, TID With Meals, Wander Hyatt MD, 12,000 units of lipase at 11/07/24 1142    pantoprazole (PROTONIX) injection 40 mg, 40 mg, Intravenous, Q12H, [place]  MD Carmita, 40 mg at 11/07/24 0828    polyethylene glycol (MIRALAX) packet 17 g, 17 g, Oral, Daily, Arlyn Wong PA-C, 17 g at 11/07/24 1142    saccharomyces boulardii (FLORASTOR) capsule 250 mg, 250 mg, Oral, Daily, Carmita Hyatt MD, 250 mg at 11/07/24 0828    sennosides-docusate (PERICOLACE) 8.6-50 MG per tablet 2 tablet, 2 tablet, Oral, Daily, Arlyn Wong PA-C, 2 tablet at 11/07/24 1142    simethicone (MYLICON) chewable tablet 80 mg, 80 mg, Oral, TID PRN, Arlyn Wong PA-C    sodium chloride 0.9 % with KCl 20 mEq/L infusion, 125 mL/hr, Intravenous, Continuous, Carmita Hyatt MD, Last Rate: 125 mL/hr at 11/07/24 0830, 125 mL/hr at 11/07/24 0830    traZODone (DESYREL) tablet 50 mg, 50 mg, Oral, Nightly, Carmita Hyatt MD, 50 mg at 11/06/24 2133     ASSESSMENT  Acute on chronic pancreatitis  Anxiety disorder  Depression    PLAN  CPM  IVF  Clear liquid diet  Symptomatic treatment for pain and nausea  GI consult appreciated  Continue home medications  Stress ulcer DVT prophylaxis  Supportive care  Discussed with nursing staff  Follow closely further recommendation according to hospital course    CARMITA HYATT MD    Copied text in this note has been reviewed and is accurate as of 11/07/24

## 2024-11-07 NOTE — PROGRESS NOTES
[facility]  Inpatient Progress Note    Reason for Followup:  pancreatitis     Subjective     Interval History:   Lipase downtrending. Normal TG.  Patient reports ongoing abdominal pain this morning but feels like it is in her lower abdomen and is secondary to gas and cramping.  She has not had a bowel movement for many days.  No further nausea or vomiting.    Current Facility-Administered Medications:     busPIRone (BUSPAR) tablet 5 mg, 5 mg, Oral, TID, Wander Hyatt MD, 5 mg at 11/07/24 0827    hydrALAZINE (APRESOLINE) injection 10 mg, 10 mg, Intravenous, Q4H PRN, Wander Hyatt MD, 10 mg at 11/05/24 2034    HYDROmorphone (DILAUDID) injection 0.5 mg, 0.5 mg, Intravenous, Q3H PRN, Wander Hyatt MD, 0.5 mg at 11/07/24 0837    influenza virus vacc split PF FLUZONE 0.5 mL, 0.5 mL, Intramuscular, During Hospitalization, Wander Hyatt MD    ondansetron (ZOFRAN) injection 4 mg, 4 mg, Intravenous, Q4H PRN, Wander Hyatt MD, 4 mg at 11/07/24 0837    pancrelipase (Lip-Prot-Amyl) (CREON) capsule 12,000 units of lipase, 12,000 units of lipase, Oral, TID With Meals, Wander Hyatt MD, 12,000 units of lipase at 11/07/24 0827    pantoprazole (PROTONIX) injection 40 mg, 40 mg, Intravenous, Q12H, Wander Hyatt MD, 40 mg at 11/07/24 0828    saccharomyces boulardii (FLORASTOR) capsule 250 mg, 250 mg, Oral, Daily, Wander Hyatt MD, 250 mg at 11/07/24 0828    sodium chloride 0.9 % with KCl 20 mEq/L infusion, 125 mL/hr, Intravenous, Continuous, Wander Hyatt MD, Last Rate: 125 mL/hr at 11/07/24 0830, 125 mL/hr at 11/07/24 0830    traZODone (DESYREL) tablet 50 mg, 50 mg, Oral, Nightly, Wander Hyatt MD, 50 mg at 11/06/24 3672    Objective     Vital Signs  Temp:  [97.7 °F (36.5 °C)-98.5 °F (36.9 °C)] 97.9 °F (36.6 °C)  Heart Rate:  [] 91  Resp:  [17-18] 18  BP: (131-161)/() 138/89  Body mass index is 22.27 kg/m².    Intake/Output Summary (Last 24 hours) at 11/7/2024 0850  Last data filed at 11/7/2024  0034  Gross per 24 hour   Intake 1169 ml   Output --   Net 1169 ml     No intake/output data recorded.     Physical Exam:   General: patient awake, alert and cooperative   Abdomen: soft, minimal tenderness, nondistended; normal bowel sounds     Results Review:     I reviewed the patient's new clinical results.    Results from last 7 days   Lab Units 11/07/24  0529 11/06/24  0521 11/05/24  1616   WBC 10*3/mm3 8.86 11.22* 11.30*   HEMOGLOBIN g/dL 11.5* 14.1 14.6   HEMATOCRIT % 33.9* 40.5 40.7   PLATELETS 10*3/mm3 162 226 227     Results from last 7 days   Lab Units 11/07/24  0529 11/06/24  0521 11/05/24  1616   SODIUM mmol/L 132* 134* 135*   POTASSIUM mmol/L 3.5 3.6 3.5   CHLORIDE mmol/L 99 99 94*   CO2 mmol/L 22.4 22.3 23.7   BUN mg/dL 3* 9 7   CREATININE mg/dL 0.49* 0.71 0.75   CALCIUM mg/dL 8.0* 8.6 9.7   BILIRUBIN mg/dL 0.7 0.9 1.0   ALK PHOS U/L 54 44 60   ALT (SGPT) U/L 19 25 34*   AST (SGOT) U/L 28 30 35*   GLUCOSE mg/dL 69 106* 148*         Lab Results   Lab Value Date/Time    LIPASE 1,309 (H) 11/07/2024 0529    LIPASE 2,357 (H) 11/06/2024 0521    LIPASE 1,920 (H) 11/05/2024 1616       Radiology:  CT Abdomen Pelvis With Contrast   Final Result   1. Acute interstitial edematous pancreatitis.   2. Post cholecystectomy.   3. Bilateral adrenal nodules are indeterminate in density by   contrast-enhanced CT and can be further evaluated with nonemergent   outpatient noncontrast abdominal CT. Adrenal adenomas would be most   likely.       This report was finalized on 11/5/2024 5:25 PM by Dr. Irineo Villa M.D on Workstation: [id number]                Assessment & Plan   Assessment:   Acute on chronic pancreatitis   Abdominal   Nausea and vomiting   History of CCY   Hx of GERD  IBS-M    Plan:   Acute on chronic pancreatitis likely in the setting of recent alcohol use.  Continue supportive care with IV fluids and as needed analgesia  Patient on Zenpep outpatient, agree with restarting Creon  Add bowel regimen  including MiraLAX and Senokot  Dicyclomine and simethicone as needed with history of IBS  Clear liquid diet  Consider outpatient MRI in 6 to 8 weeks - she will need outpatient follow-up with her primary GI Dr. Maldonado.    I discussed the patient's findings and my recommendations with patient.    Dictated utilizing Dragon dictation.        Arlyn Wong PA-C   [facility]  [address] [place] [address]  Office: [phone]

## 2024-11-08 LAB
ANION GAP SERPL CALCULATED.3IONS-SCNC: 13 MMOL/L (ref 5–15)
BASOPHILS # BLD AUTO: 0.03 10*3/MM3 (ref 0–0.2)
BASOPHILS NFR BLD AUTO: 0.4 % (ref 0–1.5)
BUN SERPL-MCNC: <2 MG/DL (ref 6–20)
BUN/CREAT SERPL: ABNORMAL
CALCIUM SPEC-SCNC: 8 MG/DL (ref 8.6–10.5)
CHLORIDE SERPL-SCNC: 99 MMOL/L (ref 98–107)
CO2 SERPL-SCNC: 24 MMOL/L (ref 22–29)
CREAT SERPL-MCNC: 0.46 MG/DL (ref 0.57–1)
DEPRECATED RDW RBC AUTO: 40.8 FL (ref 37–54)
EGFRCR SERPLBLD CKD-EPI 2021: 117.5 ML/MIN/1.73
EOSINOPHIL # BLD AUTO: 0.42 10*3/MM3 (ref 0–0.4)
EOSINOPHIL NFR BLD AUTO: 5.4 % (ref 0.3–6.2)
ERYTHROCYTE [DISTWIDTH] IN BLOOD BY AUTOMATED COUNT: 11.4 % (ref 12.3–15.4)
GLUCOSE SERPL-MCNC: 75 MG/DL (ref 65–99)
HCT VFR BLD AUTO: 30 % (ref 34–46.6)
HGB BLD-MCNC: 10.5 G/DL (ref 12–15.9)
IMM GRANULOCYTES # BLD AUTO: 0.03 10*3/MM3 (ref 0–0.05)
IMM GRANULOCYTES NFR BLD AUTO: 0.4 % (ref 0–0.5)
LIPASE SERPL-CCNC: 439 U/L (ref 13–60)
LYMPHOCYTES # BLD AUTO: 1.14 10*3/MM3 (ref 0.7–3.1)
LYMPHOCYTES NFR BLD AUTO: 14.7 % (ref 19.6–45.3)
MCH RBC QN AUTO: 35.6 PG (ref 26.6–33)
MCHC RBC AUTO-ENTMCNC: 35 G/DL (ref 31.5–35.7)
MCV RBC AUTO: 101.7 FL (ref 79–97)
MONOCYTES # BLD AUTO: 0.78 10*3/MM3 (ref 0.1–0.9)
MONOCYTES NFR BLD AUTO: 10.1 % (ref 5–12)
NEUTROPHILS NFR BLD AUTO: 5.34 10*3/MM3 (ref 1.7–7)
NEUTROPHILS NFR BLD AUTO: 69 % (ref 42.7–76)
NRBC BLD AUTO-RTO: 0 /100 WBC (ref 0–0.2)
PLATELET # BLD AUTO: 163 10*3/MM3 (ref 140–450)
PMV BLD AUTO: 9.1 FL (ref 6–12)
POTASSIUM SERPL-SCNC: 3.4 MMOL/L (ref 3.5–5.2)
RBC # BLD AUTO: 2.95 10*6/MM3 (ref 3.77–5.28)
SODIUM SERPL-SCNC: 136 MMOL/L (ref 136–145)
WBC NRBC COR # BLD AUTO: 7.74 10*3/MM3 (ref 3.4–10.8)

## 2024-11-08 PROCEDURE — G0008 ADMIN INFLUENZA VIRUS VAC: HCPCS | Performed by: HOSPITALIST

## 2024-11-08 PROCEDURE — 99232 SBSQ HOSP IP/OBS MODERATE 35: CPT | Performed by: PHYSICIAN ASSISTANT

## 2024-11-08 PROCEDURE — 25010000002 SODIUM CHLORIDE 0.9 % WITH KCL 20 MEQ 20-0.9 MEQ/L-% SOLUTION: Performed by: HOSPITALIST

## 2024-11-08 PROCEDURE — 90656 IIV3 VACC NO PRSV 0.5 ML IM: CPT | Performed by: HOSPITALIST

## 2024-11-08 PROCEDURE — 80048 BASIC METABOLIC PNL TOTAL CA: CPT | Performed by: HOSPITALIST

## 2024-11-08 PROCEDURE — 83690 ASSAY OF LIPASE: CPT | Performed by: HOSPITALIST

## 2024-11-08 PROCEDURE — 85025 COMPLETE CBC W/AUTO DIFF WBC: CPT | Performed by: HOSPITALIST

## 2024-11-08 PROCEDURE — 25010000002 HYDROMORPHONE PER 4 MG: Performed by: HOSPITALIST

## 2024-11-08 PROCEDURE — 25010000002 INFLUENZA VIRUS VACC SPLIT PF 0.5 ML SUSPENSION PREFILLED SYRINGE: Performed by: HOSPITALIST

## 2024-11-08 RX ORDER — PANTOPRAZOLE SODIUM 40 MG/1
40 TABLET, DELAYED RELEASE ORAL
Status: DISCONTINUED | OUTPATIENT
Start: 2024-11-08 | End: 2024-11-10 | Stop reason: HOSPADM

## 2024-11-08 RX ORDER — PANTOPRAZOLE SODIUM 40 MG/1
40 TABLET, DELAYED RELEASE ORAL
Status: DISCONTINUED | OUTPATIENT
Start: 2024-11-09 | End: 2024-11-08

## 2024-11-08 RX ADMIN — PANTOPRAZOLE SODIUM 40 MG: 40 TABLET, DELAYED RELEASE ORAL at 17:15

## 2024-11-08 RX ADMIN — INFLUENZA A VIRUS A/VICTORIA/4897/2022 IVR-238 (H1N1) ANTIGEN (FORMALDEHYDE INACTIVATED), INFLUENZA A VIRUS A/CALIFORNIA/122/2022 SAN-022 (H3N2) ANTIGEN (FORMALDEHYDE INACTIVATED), AND INFLUENZA B VIRUS B/MICHIGAN/01/2021 ANTIGEN (FORMALDEHYDE INACTIVATED) 0.5 ML: 15; 15; 15 INJECTION, SUSPENSION INTRAMUSCULAR at 18:31

## 2024-11-08 RX ADMIN — DICYCLOMINE HYDROCHLORIDE 10 MG: 10 CAPSULE ORAL at 21:50

## 2024-11-08 RX ADMIN — BUSPIRONE HYDROCHLORIDE 5 MG: 5 TABLET ORAL at 08:56

## 2024-11-08 RX ADMIN — HYDROMORPHONE HYDROCHLORIDE 0.5 MG: 1 INJECTION, SOLUTION INTRAMUSCULAR; INTRAVENOUS; SUBCUTANEOUS at 05:32

## 2024-11-08 RX ADMIN — HYDROMORPHONE HYDROCHLORIDE 0.5 MG: 1 INJECTION, SOLUTION INTRAMUSCULAR; INTRAVENOUS; SUBCUTANEOUS at 13:17

## 2024-11-08 RX ADMIN — BUSPIRONE HYDROCHLORIDE 5 MG: 5 TABLET ORAL at 20:21

## 2024-11-08 RX ADMIN — POTASSIUM CHLORIDE AND SODIUM CHLORIDE 125 ML/HR: 900; 150 INJECTION, SOLUTION INTRAVENOUS at 02:34

## 2024-11-08 RX ADMIN — HYDROMORPHONE HYDROCHLORIDE 0.5 MG: 1 INJECTION, SOLUTION INTRAMUSCULAR; INTRAVENOUS; SUBCUTANEOUS at 21:51

## 2024-11-08 RX ADMIN — HYDROMORPHONE HYDROCHLORIDE 0.5 MG: 1 INJECTION, SOLUTION INTRAMUSCULAR; INTRAVENOUS; SUBCUTANEOUS at 00:33

## 2024-11-08 RX ADMIN — DICYCLOMINE HYDROCHLORIDE 10 MG: 10 CAPSULE ORAL at 00:40

## 2024-11-08 RX ADMIN — DICYCLOMINE HYDROCHLORIDE 10 MG: 10 CAPSULE ORAL at 13:27

## 2024-11-08 RX ADMIN — HYDROMORPHONE HYDROCHLORIDE 0.5 MG: 1 INJECTION, SOLUTION INTRAMUSCULAR; INTRAVENOUS; SUBCUTANEOUS at 17:27

## 2024-11-08 RX ADMIN — Medication 250 MG: at 08:36

## 2024-11-08 RX ADMIN — PANCRELIPASE 12000 UNITS OF LIPASE: 60000; 12000; 38000 CAPSULE, DELAYED RELEASE PELLETS ORAL at 08:36

## 2024-11-08 RX ADMIN — HYDROMORPHONE HYDROCHLORIDE 0.5 MG: 1 INJECTION, SOLUTION INTRAMUSCULAR; INTRAVENOUS; SUBCUTANEOUS at 09:24

## 2024-11-08 RX ADMIN — PANTOPRAZOLE SODIUM 40 MG: 40 INJECTION, POWDER, FOR SOLUTION INTRAVENOUS at 08:36

## 2024-11-08 RX ADMIN — PANCRELIPASE 12000 UNITS OF LIPASE: 60000; 12000; 38000 CAPSULE, DELAYED RELEASE PELLETS ORAL at 17:15

## 2024-11-08 RX ADMIN — PANCRELIPASE 12000 UNITS OF LIPASE: 60000; 12000; 38000 CAPSULE, DELAYED RELEASE PELLETS ORAL at 11:56

## 2024-11-08 RX ADMIN — SENNOSIDES AND DOCUSATE SODIUM 2 TABLET: 50; 8.6 TABLET ORAL at 08:36

## 2024-11-08 RX ADMIN — POTASSIUM CHLORIDE AND SODIUM CHLORIDE 125 ML/HR: 900; 150 INJECTION, SOLUTION INTRAVENOUS at 11:56

## 2024-11-08 RX ADMIN — POLYETHYLENE GLYCOL 3350 17 G: 17 POWDER, FOR SOLUTION ORAL at 08:36

## 2024-11-08 RX ADMIN — SIMETHICONE 80 MG: 80 TABLET, CHEWABLE ORAL at 05:46

## 2024-11-08 RX ADMIN — BUSPIRONE HYDROCHLORIDE 5 MG: 5 TABLET ORAL at 15:11

## 2024-11-08 RX ADMIN — TRAZODONE HYDROCHLORIDE 50 MG: 50 TABLET ORAL at 20:21

## 2024-11-08 NOTE — CASE MANAGEMENT/SOCIAL WORK
Continued Stay Note  [facility]     Patient Name: Bushra Henriquez  MRN: [id number]  Today's Date: 11/8/2024    Admit Date: 11/5/2024    Plan: Home with s/o   Discharge Plan       Row Name 11/08/24 1217       Plan    Plan Home with s/o    Patient/Family in Agreement with Plan yes    Plan Comments CCP reviewed chart, discussed with primary RN, GI has signed off, planned f/u with imaging in 6-8 weeks. Pt still has c/o pain and receiving IV medications, somewhat tolerating clear liquids. Spoke with Dr. Edmar dc not planned for today, perhaps over weekend. Plan remains home without  needs. CCP will follow - Radha ESPAÑA                   Discharge Codes    No documentation.                 Expected Discharge Date and Time       Expected Discharge Date Expected Discharge Time    Nov 10, 2024               Radha Navarro RN

## 2024-11-08 NOTE — PAYOR COMM NOTE
"Abi Henriquez (49 y.o. Female)        PLEASE SEE ATTACHED FOR INPT CONTINUED STAY AUTH.     REF#[id number]    PLEASE CALL  OR  [phone]    THANK YOU    RUBEN SHAYY PROCTOR [facility]   Date of Birth   1975    Social Security Number       Address   [address] [place] [address]    Home Phone   [phone]    MRN   [id number]       Cheondoism   Shinto    Marital Status                               Admission Date   11/5/24    Admission Type   Emergency    Admitting Provider   Wander Hyatt MD    Attending Provider   Wander Hyatt MD    Department, Room/Bed   [address], [address]       Discharge Date       Discharge Disposition       Discharge Destination                                 Attending Provider: Wander Hyatt MD    Allergies: Lorazepam, Codeine    Isolation: None   Infection: None   Code Status: CPR    Ht: 157.5 cm (62.01\")   Wt: 55.2 kg (121 lb 12.8 oz)    Admission Cmt: None   Principal Problem: Acute pancreatitis [K85.90]                   Active Insurance as of 11/5/2024       Primary Coverage       Payor Plan Insurance Group Employer/Plan Group    [employer]HE[employer] BLUE CROSS ANTHEM BLUE CROSS BLUE SHIELD PPO [id number]       Payor Plan Address Payor Plan Phone Number Payor Plan Fax Number Effective Dates    [address] [phone]  6/1/2020 - None Entered    [facility] [id number]         Subscriber Name Subscriber Birth Date Member ID       ABI HENRIQUEZ 1975 [id number]                     Emergency Contacts        (Rel.) Home Phone Work Phone Mobile Phone    Shereen Anglin (Daughter) -- -- [phone]              [place]: [facility] [id number]  Tax ID [id number]  Oxygen Therapy (last day)       Date/Time SpO2 Device (Oxygen Therapy) Flow (L/min) (Oxygen Therapy) Oxygen Concentration (%) ETCO2 (mmHg)    11/08/24 0800 -- room air -- -- --    11/08/24 0730 98 room air -- -- --    11/08/24 0030 -- room air -- -- --    11/07/24 2310 97 -- -- " "-- --    11/07/24 2010 -- room air -- -- --    11/07/24 2000 99 -- -- -- --    11/07/24 1400 -- room air -- -- --    11/07/24 1315 99 room air -- -- --    11/07/24 0827 -- room air -- -- --    11/07/24 0715 100 room air -- -- --    11/07/24 0022 -- room air -- -- --          Intake & Output (last day)         11/07 0701 11/08 0700 11/08 0701 11/09 0700    P.O. 480 240    I.V. (mL/kg)      Total Intake(mL/kg) 480 (8.7) 240 (4.3)    Net +480 +240                Lines, Drains & Airways       Active LDAs       Name Placement date Placement time Site Days    Peripheral IV 11/06/24 0956 Distal;Posterior;Right Forearm 11/06/24  0956  Forearm  2                    Blood Administration Record (From admission, onward)      None          Operative/Procedure Notes (last 24 hours)  Notes from 11/07/24 1400 through 11/08/24 1400   No notes of this type exist for this encounter.          Physician Progress Notes (last 24 hours)        Wander Hyatt MD at 11/08/24 1308          Daily progress note    Primary care physician      Subjective  Awake and alert and clinically looks better but developed severe pain last night with clear liquid diet with nausea.  Patient family at bedside.    History of present illness  49-year-old white female with history of pancreatitis in the past anxiety disorder depression and chronic pancreatitis presented to [facility] emergency room with severe abdominal pain which radiated to the back associate with nausea but no vomiting diarrhea.  Patient denies any fever chills chest pain shortness of breath.  Patient workup in ER revealed acute on chronic pancreatitis admitted for management.  Patient stated that she drinks alcohol socially and had a drink couple of days ago.    REVIEW OF SYSTEMS  Unremarkable X abdominal pain with nausea     PHYSICAL EXAM   Blood pressure 120/93, pulse 88, temperature 97.5 °F (36.4 °C), temperature source Oral, resp. rate 18, height 157.5 cm (62.01\"), " weight 55.2 kg (121 lb 12.8 oz), SpO2 98%, not currently breastfeeding.    GENERAL: Awake and alert in no distress   SKIN: Warm, dry  HENT: Normocephalic, atraumatic  EYES: no scleral icterus  CV: regular rhythm, regular rate  RESPIRATORY: normal effort, lungs clear  ABDOMEN: soft, mild epigastric tenderness bowel sounds positive  MUSCULOSKELETAL: no deformity  NEURO: alert, moves all extremities, follows commands     LAB RESULTS  Lab Results (last 24 hours)       Procedure Component Value Units Date/Time    Lipase [[id number]]  (Abnormal) Collected: 11/08/24 0630    Specimen: Blood Updated: 11/08/24 0737     Lipase 439 U/L     Basic Metabolic Panel [[id number]]  (Abnormal) Collected: 11/08/24 0630    Specimen: Blood Updated: 11/08/24 0728     Glucose 75 mg/dL      BUN <2 mg/dL      Creatinine 0.46 mg/dL      Sodium 136 mmol/L      Potassium 3.4 mmol/L      Chloride 99 mmol/L      CO2 24.0 mmol/L      Calcium 8.0 mg/dL      BUN/Creatinine Ratio --     Comment: Unable to calculate Bun/Crea Ratio.        Anion Gap 13.0 mmol/L      eGFR 117.5 mL/min/1.73     Narrative:      GFR Normal >60  Chronic Kidney Disease <60  Kidney Failure <15      CBC & Differential [[id number]]  (Abnormal) Collected: 11/08/24 0630    Specimen: Blood Updated: 11/08/24 0713    Narrative:      The following orders were created for panel order CBC & Differential.  Procedure                               Abnormality         Status                     ---------                               -----------         ------                     CBC Auto Differential[[id number]]        Abnormal            Final result                 Please view results for these tests on the individual orders.    CBC Auto Differential [[id number]]  (Abnormal) Collected: 11/08/24 0630    Specimen: Blood Updated: 11/08/24 0713     WBC 7.74 10*3/mm3      RBC 2.95 10*6/mm3      Hemoglobin 10.5 g/dL      Hematocrit 30.0 %      .7 fL      MCH 35.6 pg      MCHC 35.0 g/dL       RDW 11.4 %      RDW-SD 40.8 fl      MPV 9.1 fL      Platelets 163 10*3/mm3      Neutrophil % 69.0 %      Lymphocyte % 14.7 %      Monocyte % 10.1 %      Eosinophil % 5.4 %      Basophil % 0.4 %      Immature Grans % 0.4 %      Neutrophils, Absolute 5.34 10*3/mm3      Lymphocytes, Absolute 1.14 10*3/mm3      Monocytes, Absolute 0.78 10*3/mm3      Eosinophils, Absolute 0.42 10*3/mm3      Basophils, Absolute 0.03 10*3/mm3      Immature Grans, Absolute 0.03 10*3/mm3      nRBC 0.0 /100 WBC     Blood Culture - Blood, Arm, Right [[id number]]  (Normal) Collected: 11/05/24 1811    Specimen: Blood from Arm, Right Updated: 11/07/24 1831     Blood Culture No growth at 2 days    Narrative:      Less than seven (7) mL's of blood was collected.  Insufficient quantity may yield false negative results.    Blood Culture - Blood, Arm, Left [[id number]]  (Normal) Collected: 11/05/24 1811    Specimen: Blood from Arm, Left Updated: 11/07/24 1831     Blood Culture No growth at 2 days    Narrative:      Less than seven (7) mL's of blood was collected.  Insufficient quantity may yield false negative results.          Imaging Results (Last 24 Hours)       ** No results found for the last 24 hours. **            Current Facility-Administered Medications:     busPIRone (BUSPAR) tablet 5 mg, 5 mg, Oral, TID, Wander Hyatt MD, 5 mg at 11/08/24 0856    dicyclomine (BENTYL) capsule 10 mg, 10 mg, Oral, 4x Daily PRN, Arlyn Wong PA-C, 10 mg at 11/08/24 0040    HYDROmorphone (DILAUDID) injection 0.5 mg, 0.5 mg, Intravenous, Q3H PRN, Wander Hyatt MD, 0.5 mg at 11/08/24 0924    influenza virus vacc split PF FLUZONE 0.5 mL, 0.5 mL, Intramuscular, During Hospitalization, Wander Hyatt MD    ondansetron (ZOFRAN) injection 4 mg, 4 mg, Intravenous, Q4H PRN, Wander Hyatt MD, 4 mg at 11/07/24 0837    pancrelipase (Lip-Prot-Amyl) (CREON) capsule 12,000 units of lipase, 12,000 units of lipase, Oral, TID With Meals, Wander Hyatt MD, 12,000 units of  lipase at 11/08/24 1156    pantoprazole (PROTONIX) injection 40 mg, 40 mg, Intravenous, Q12H, Carmita Hyatt MD, 40 mg at 11/08/24 0836    polyethylene glycol (MIRALAX) packet 17 g, 17 g, Oral, Daily, Arlyn Wong PA-C, 17 g at 11/08/24 0836    saccharomyces boulardii (FLORASTOR) capsule 250 mg, 250 mg, Oral, Daily, Carmita Hyatt MD, 250 mg at 11/08/24 0836    sennosides-docusate (PERICOLACE) 8.6-50 MG per tablet 2 tablet, 2 tablet, Oral, Daily, Arlyn Wong PA-C, 2 tablet at 11/08/24 0836    simethicone (MYLICON) chewable tablet 80 mg, 80 mg, Oral, TID PRN, Arlyn Wong PA-C, 80 mg at 11/08/24 0546    sodium chloride 0.9 % with KCl 20 mEq/L infusion, 125 mL/hr, Intravenous, Continuous, Carmita Hyatt MD, Last Rate: 125 mL/hr at 11/08/24 1156, 125 mL/hr at 11/08/24 1156    traZODone (DESYREL) tablet 50 mg, 50 mg, Oral, Nightly, Carmita Hyatt MD, 50 mg at 11/07/24 2045     ASSESSMENT  Acute on chronic pancreatitis resolving  Anxiety disorder  Depression    PLAN  CPM  Continue IVF  Continue clear liquid diet  Symptomatic treatment for pain and nausea  GI consult appreciated  Continue home medications  Stress ulcer DVT prophylaxis  Supportive care  Discussed with nursing staff  Discharge planning    CARMITA HYATT MD    Copied text in this note has been reviewed and is accurate as of 11/08/24               Electronically signed by Carmita Hyatt MD at 11/08/24 1309       Arlyn Wong PA-C at 11/08/24 0841          [facility]  Inpatient Progress Note    Reason for Followup: Pancreatitis    Subjective     Interval History:   Patient reports some bloating overnight which has improved this morning with simethicone.  She feels like overall her pain is better.  She is passing gas but no bowel movement as of yet.  No further nausea or vomiting.  She is interested in trying regular food.    Current Facility-Administered Medications:     busPIRone (BUSPAR) tablet 5 mg, 5  mg, Oral, TID, Wander Hyatt MD, 5 mg at 11/07/24 2045    dicyclomine (BENTYL) capsule 10 mg, 10 mg, Oral, 4x Daily PRN, Arlyn Wong PA-C, 10 mg at 11/08/24 0040    HYDROmorphone (DILAUDID) injection 0.5 mg, 0.5 mg, Intravenous, Q3H PRN, Wander Hyatt MD, 0.5 mg at 11/08/24 0532    influenza virus vacc split PF FLUZONE 0.5 mL, 0.5 mL, Intramuscular, During Hospitalization, Wander Hyatt MD    ondansetron (ZOFRAN) injection 4 mg, 4 mg, Intravenous, Q4H PRN, Wander Hyatt MD, 4 mg at 11/07/24 0837    pancrelipase (Lip-Prot-Amyl) (CREON) capsule 12,000 units of lipase, 12,000 units of lipase, Oral, TID With Meals, Wander Hyatt MD, 12,000 units of lipase at 11/08/24 0836    pantoprazole (PROTONIX) injection 40 mg, 40 mg, Intravenous, Q12H, Wander Hyatt MD, 40 mg at 11/08/24 0836    polyethylene glycol (MIRALAX) packet 17 g, 17 g, Oral, Daily, ClinkenArlyn yoder PA-C, 17 g at 11/08/24 0836    saccharomyces boulardii (FLORASTOR) capsule 250 mg, 250 mg, Oral, Daily, Wander Hyatt MD, 250 mg at 11/08/24 0836    sennosides-docusate (PERICOLACE) 8.6-50 MG per tablet 2 tablet, 2 tablet, Oral, Daily, ClinArlyn tabor PA-C, 2 tablet at 11/08/24 0836    simethicone (MYLICON) chewable tablet 80 mg, 80 mg, Oral, TID PRN, Arlyn Wong PA-C, 80 mg at 11/08/24 0546    sodium chloride 0.9 % with KCl 20 mEq/L infusion, 125 mL/hr, Intravenous, Continuous, Wander Hyatt MD, Last Rate: 125 mL/hr at 11/08/24 0234, 125 mL/hr at 11/08/24 0234    traZODone (DESYREL) tablet 50 mg, 50 mg, Oral, Nightly, Wander Hyatt MD, 50 mg at 11/07/24 2045    Objective     Vital Signs  Temp:  [98.2 °F (36.8 °C)-98.6 °F (37 °C)] 98.2 °F (36.8 °C)  Heart Rate:  [] 91  Resp:  [16-18] 18  BP: (132-143)/() 141/93  Body mass index is 22.27 kg/m².    Intake/Output Summary (Last 24 hours) at 11/8/2024 0841  Last data filed at 11/7/2024 1813  Gross per 24 hour   Intake 480 ml   Output --   Net 480 ml     No intake/output data  recorded.     Physical Exam:   General: patient awake, alert and cooperative   Abdomen: soft, nontender, nondistended; normal bowel sounds     Results Review:     I reviewed the patient's new clinical results.    Results from last 7 days   Lab Units 11/08/24 0630 11/07/24 0529 11/06/24 0521   WBC 10*3/mm3 7.74 8.86 11.22*   HEMOGLOBIN g/dL 10.5* 11.5* 14.1   HEMATOCRIT % 30.0* 33.9* 40.5   PLATELETS 10*3/mm3 163 162 226     Results from last 7 days   Lab Units 11/08/24  0630 11/07/24  0529 11/06/24  0521 11/05/24  1616   SODIUM mmol/L 136 132* 134* 135*   POTASSIUM mmol/L 3.4* 3.5 3.6 3.5   CHLORIDE mmol/L 99 99 99 94*   CO2 mmol/L 24.0 22.4 22.3 23.7   BUN mg/dL <2* 3* 9 7   CREATININE mg/dL 0.46* 0.49* 0.71 0.75   CALCIUM mg/dL 8.0* 8.0* 8.6 9.7   BILIRUBIN mg/dL  --  0.7 0.9 1.0   ALK PHOS U/L  --  54 44 60   ALT (SGPT) U/L  --  19 25 34*   AST (SGOT) U/L  --  28 30 35*   GLUCOSE mg/dL 75 69 106* 148*         Lab Results   Lab Value Date/Time    LIPASE 439 (H) 11/08/2024 0630    LIPASE 1,309 (H) 11/07/2024 0529    LIPASE 2,357 (H) 11/06/2024 0521    LIPASE 1,920 (H) 11/05/2024 1616       Radiology:  CT Abdomen Pelvis With Contrast   Final Result   1. Acute interstitial edematous pancreatitis.   2. Post cholecystectomy.   3. Bilateral adrenal nodules are indeterminate in density by   contrast-enhanced CT and can be further evaluated with nonemergent   outpatient noncontrast abdominal CT. Adrenal adenomas would be most   likely.       This report was finalized on 11/5/2024 5:25 PM by Dr. Irineo Villa M.D on Workstation: [id number]                Assessment & Plan   Assessment:   Acute on chronic pancreatitis   Abdominal   Nausea and vomiting   History of CCY   Hx of GERD  IBS-M    Plan:   Acute on chronic pancreatitis likely in the setting of recent alcohol use.  Continue supportive care with IV fluids and as needed analgesia  Patient on Zenpep outpatient, agree with restarting Creon  Add bowel regimen  including MiraLAX and Senokot  Dicyclomine and simethicone as needed with history of IBS  Encouraged her to try clear liquids again this morning and if tolerated can be advanced to GI soft diet.  If she is doing better this evening okay for discharge home from a GI standpoint.  Will plan for outpatient MRI in 6 to 8 weeks.  Will plan for outpatient follow-up with me in 3 to 4 weeks.  GI will sign off.    I discussed the patient's findings and my recommendations with patient.    Dictated utilizing Dragon dictation.        Arlyn Wong PA-C   [facility]  [address] [place] [address]  Office: [phone]               Electronically signed by Arlyn Wong PA-C at 11/08/24 1101       Consult Notes (last 24 hours)  Notes from 11/07/24 1401 through 11/08/24 1401   No notes of this type exist for this encounter.

## 2024-11-08 NOTE — PLAN OF CARE
Goal Outcome Evaluation:           Progress: improving  Outcome Evaluation: Pt is alert and oriented. Diet was changed to clear liquids but abdomen swelled some so she refused any more.  Bowel sounds are present but hypoactive, pt is passing some air. Pain is controlled with pain medication.

## 2024-11-08 NOTE — PLAN OF CARE
Goal Outcome Evaluation:              Outcome Evaluation: medicated prn for pain.  Pt denies improvement in pain today.  Advanced diet to full liquid.  No nausea.  C/o increased bloating.  Up ad lisandra.

## 2024-11-08 NOTE — PROGRESS NOTES
"Daily progress note    Primary care physician      Subjective  Awake and alert and clinically looks better but developed severe pain last night with clear liquid diet with nausea.  Patient family at bedside.    History of present illness  49-year-old white female with history of pancreatitis in the past anxiety disorder depression and chronic pancreatitis presented to [facility] emergency room with severe abdominal pain which radiated to the back associate with nausea but no vomiting diarrhea.  Patient denies any fever chills chest pain shortness of breath.  Patient workup in ER revealed acute on chronic pancreatitis admitted for management.  Patient stated that she drinks alcohol socially and had a drink couple of days ago.    REVIEW OF SYSTEMS  Unremarkable X abdominal pain with nausea     PHYSICAL EXAM   Blood pressure 120/93, pulse 88, temperature 97.5 °F (36.4 °C), temperature source Oral, resp. rate 18, height 157.5 cm (62.01\"), weight 55.2 kg (121 lb 12.8 oz), SpO2 98%, not currently breastfeeding.    GENERAL: Awake and alert in no distress   SKIN: Warm, dry  HENT: Normocephalic, atraumatic  EYES: no scleral icterus  CV: regular rhythm, regular rate  RESPIRATORY: normal effort, lungs clear  ABDOMEN: soft, mild epigastric tenderness bowel sounds positive  MUSCULOSKELETAL: no deformity  NEURO: alert, moves all extremities, follows commands     LAB RESULTS  Lab Results (last 24 hours)       Procedure Component Value Units Date/Time    Lipase [[id number]]  (Abnormal) Collected: 11/08/24 0630    Specimen: Blood Updated: 11/08/24 0737     Lipase 439 U/L     Basic Metabolic Panel [[id number]]  (Abnormal) Collected: 11/08/24 0630    Specimen: Blood Updated: 11/08/24 0728     Glucose 75 mg/dL      BUN <2 mg/dL      Creatinine 0.46 mg/dL      Sodium 136 mmol/L      Potassium 3.4 mmol/L      Chloride 99 mmol/L      CO2 24.0 mmol/L      Calcium 8.0 mg/dL      BUN/Creatinine Ratio --     Comment: " Unable to calculate Bun/Crea Ratio.        Anion Gap 13.0 mmol/L      eGFR 117.5 mL/min/1.73     Narrative:      GFR Normal >60  Chronic Kidney Disease <60  Kidney Failure <15      CBC & Differential [[id number]]  (Abnormal) Collected: 11/08/24 0630    Specimen: Blood Updated: 11/08/24 0713    Narrative:      The following orders were created for panel order CBC & Differential.  Procedure                               Abnormality         Status                     ---------                               -----------         ------                     CBC Auto Differential[[id number]]        Abnormal            Final result                 Please view results for these tests on the individual orders.    CBC Auto Differential [[id number]]  (Abnormal) Collected: 11/08/24 0630    Specimen: Blood Updated: 11/08/24 0713     WBC 7.74 10*3/mm3      RBC 2.95 10*6/mm3      Hemoglobin 10.5 g/dL      Hematocrit 30.0 %      .7 fL      MCH 35.6 pg      MCHC 35.0 g/dL      RDW 11.4 %      RDW-SD 40.8 fl      MPV 9.1 fL      Platelets 163 10*3/mm3      Neutrophil % 69.0 %      Lymphocyte % 14.7 %      Monocyte % 10.1 %      Eosinophil % 5.4 %      Basophil % 0.4 %      Immature Grans % 0.4 %      Neutrophils, Absolute 5.34 10*3/mm3      Lymphocytes, Absolute 1.14 10*3/mm3      Monocytes, Absolute 0.78 10*3/mm3      Eosinophils, Absolute 0.42 10*3/mm3      Basophils, Absolute 0.03 10*3/mm3      Immature Grans, Absolute 0.03 10*3/mm3      nRBC 0.0 /100 WBC     Blood Culture - Blood, Arm, Right [[id number]]  (Normal) Collected: 11/05/24 1811    Specimen: Blood from Arm, Right Updated: 11/07/24 1831     Blood Culture No growth at 2 days    Narrative:      Less than seven (7) mL's of blood was collected.  Insufficient quantity may yield false negative results.    Blood Culture - Blood, Arm, Left [[id number]]  (Normal) Collected: 11/05/24 1811    Specimen: Blood from Arm, Left Updated: 11/07/24 1831     Blood Culture No growth at 2 days     Narrative:      Less than seven (7) mL's of blood was collected.  Insufficient quantity may yield false negative results.          Imaging Results (Last 24 Hours)       ** No results found for the last 24 hours. **            Current Facility-Administered Medications:     busPIRone (BUSPAR) tablet 5 mg, 5 mg, Oral, TID, Wander Hyatt MD, 5 mg at 11/08/24 0856    dicyclomine (BENTYL) capsule 10 mg, 10 mg, Oral, 4x Daily PRN, Arlyn Wong PA-C, 10 mg at 11/08/24 0040    HYDROmorphone (DILAUDID) injection 0.5 mg, 0.5 mg, Intravenous, Q3H PRN, Wander Hyatt MD, 0.5 mg at 11/08/24 0924    influenza virus vacc split PF FLUZONE 0.5 mL, 0.5 mL, Intramuscular, During Hospitalization, Wander Hyatt MD    ondansetron (ZOFRAN) injection 4 mg, 4 mg, Intravenous, Q4H PRN, Wander Hyatt MD, 4 mg at 11/07/24 0837    pancrelipase (Lip-Prot-Amyl) (CREON) capsule 12,000 units of lipase, 12,000 units of lipase, Oral, TID With Meals, Wander Hyatt MD, 12,000 units of lipase at 11/08/24 1156    pantoprazole (PROTONIX) injection 40 mg, 40 mg, Intravenous, Q12H, Wander Hyatt MD, 40 mg at 11/08/24 0836    polyethylene glycol (MIRALAX) packet 17 g, 17 g, Oral, Daily, Arlyn Wong PA-C, 17 g at 11/08/24 0836    saccharomyces boulardii (FLORASTOR) capsule 250 mg, 250 mg, Oral, Daily, Wander yHatt MD, 250 mg at 11/08/24 0836    sennosides-docusate (PERICOLACE) 8.6-50 MG per tablet 2 tablet, 2 tablet, Oral, Daily, Arlyn Wong PA-C, 2 tablet at 11/08/24 0836    simethicone (MYLICON) chewable tablet 80 mg, 80 mg, Oral, TID PRN, [place], Arlyn FOX PA-C, 80 mg at 11/08/24 0546    sodium chloride 0.9 % with KCl 20 mEq/L infusion, 125 mL/hr, Intravenous, Continuous, Wander Hyatt MD, Last Rate: 125 mL/hr at 11/08/24 1156, 125 mL/hr at 11/08/24 1156    traZODone (DESYREL) tablet 50 mg, 50 mg, Oral, Nightly, Wander Hyatt MD, 50 mg at 11/07/24 2045     ASSESSMENT  Acute on chronic pancreatitis resolving  Anxiety  disorder  Depression    PLAN  CPM  Continue IVF  Continue clear liquid diet  Symptomatic treatment for pain and nausea  GI consult appreciated  Continue home medications  Stress ulcer DVT prophylaxis  Supportive care  Discussed with nursing staff  Discharge planning    CARMITA ALDRICH MD    Copied text in this note has been reviewed and is accurate as of 11/08/24

## 2024-11-08 NOTE — PROGRESS NOTES
[facility]  Inpatient Progress Note    Reason for Followup: Pancreatitis    Subjective     Interval History:   Patient reports some bloating overnight which has improved this morning with simethicone.  She feels like overall her pain is better.  She is passing gas but no bowel movement as of yet.  No further nausea or vomiting.  She is interested in trying regular food.    Current Facility-Administered Medications:     busPIRone (BUSPAR) tablet 5 mg, 5 mg, Oral, TID, Wander Hyatt MD, 5 mg at 11/07/24 2045    dicyclomine (BENTYL) capsule 10 mg, 10 mg, Oral, 4x Daily PRN, Arlyn Wong PA-C, 10 mg at 11/08/24 0040    HYDROmorphone (DILAUDID) injection 0.5 mg, 0.5 mg, Intravenous, Q3H PRN, Wander Hyatt MD, 0.5 mg at 11/08/24 0532    influenza virus vacc split PF FLUZONE 0.5 mL, 0.5 mL, Intramuscular, During Hospitalization, Wander Hyatt MD    ondansetron (ZOFRAN) injection 4 mg, 4 mg, Intravenous, Q4H PRN, Wander Hyatt MD, 4 mg at 11/07/24 0837    pancrelipase (Lip-Prot-Amyl) (CREON) capsule 12,000 units of lipase, 12,000 units of lipase, Oral, TID With Meals, Wander Hyatt MD, 12,000 units of lipase at 11/08/24 0836    pantoprazole (PROTONIX) injection 40 mg, 40 mg, Intravenous, Q12H, Wander Hyatt MD, 40 mg at 11/08/24 0836    polyethylene glycol (MIRALAX) packet 17 g, 17 g, Oral, Daily, Arlyn Wong PA-C, 17 g at 11/08/24 0836    saccharomyces boulardii (FLORASTOR) capsule 250 mg, 250 mg, Oral, Daily, Wander Hyatt MD, 250 mg at 11/08/24 0836    sennosides-docusate (PERICOLACE) 8.6-50 MG per tablet 2 tablet, 2 tablet, Oral, Daily, Arlyn Wong PA-C, 2 tablet at 11/08/24 0836    simethicone (MYLICON) chewable tablet 80 mg, 80 mg, Oral, TID PRN, Marvin, Arlyn FOX PA-C, 80 mg at 11/08/24 0546    sodium chloride 0.9 % with KCl 20 mEq/L infusion, 125 mL/hr, Intravenous, Continuous, Wander Hyatt MD, Last Rate: 125 mL/hr at 11/08/24 0234, 125 mL/hr at 11/08/24  0234    traZODone (DESYREL) tablet 50 mg, 50 mg, Oral, Nightly, Wander Hyatt MD, 50 mg at 11/07/24 2045    Objective     Vital Signs  Temp:  [98.2 °F (36.8 °C)-98.6 °F (37 °C)] 98.2 °F (36.8 °C)  Heart Rate:  [] 91  Resp:  [16-18] 18  BP: (132-143)/() 141/93  Body mass index is 22.27 kg/m².    Intake/Output Summary (Last 24 hours) at 11/8/2024 0841  Last data filed at 11/7/2024 1813  Gross per 24 hour   Intake 480 ml   Output --   Net 480 ml     No intake/output data recorded.     Physical Exam:   General: patient awake, alert and cooperative   Abdomen: soft, nontender, nondistended; normal bowel sounds     Results Review:     I reviewed the patient's new clinical results.    Results from last 7 days   Lab Units 11/08/24 0630 11/07/24 0529 11/06/24 0521   WBC 10*3/mm3 7.74 8.86 11.22*   HEMOGLOBIN g/dL 10.5* 11.5* 14.1   HEMATOCRIT % 30.0* 33.9* 40.5   PLATELETS 10*3/mm3 163 162 226     Results from last 7 days   Lab Units 11/08/24 0630 11/07/24  0529 11/06/24  0521 11/05/24  1616   SODIUM mmol/L 136 132* 134* 135*   POTASSIUM mmol/L 3.4* 3.5 3.6 3.5   CHLORIDE mmol/L 99 99 99 94*   CO2 mmol/L 24.0 22.4 22.3 23.7   BUN mg/dL <2* 3* 9 7   CREATININE mg/dL 0.46* 0.49* 0.71 0.75   CALCIUM mg/dL 8.0* 8.0* 8.6 9.7   BILIRUBIN mg/dL  --  0.7 0.9 1.0   ALK PHOS U/L  --  54 44 60   ALT (SGPT) U/L  --  19 25 34*   AST (SGOT) U/L  --  28 30 35*   GLUCOSE mg/dL 75 69 106* 148*         Lab Results   Lab Value Date/Time    LIPASE 439 (H) 11/08/2024 0630    LIPASE 1,309 (H) 11/07/2024 0529    LIPASE 2,357 (H) 11/06/2024 0521    LIPASE 1,920 (H) 11/05/2024 1616       Radiology:  CT Abdomen Pelvis With Contrast   Final Result   1. Acute interstitial edematous pancreatitis.   2. Post cholecystectomy.   3. Bilateral adrenal nodules are indeterminate in density by   contrast-enhanced CT and can be further evaluated with nonemergent   outpatient noncontrast abdominal CT. Adrenal adenomas would be most   likely.        This report was finalized on 11/5/2024 5:25 PM by Dr. Irineo Villa M.D on Workstation: [id number]                Assessment & Plan   Assessment:   Acute on chronic pancreatitis   Abdominal   Nausea and vomiting   History of CCY   Hx of GERD  IBS-M    Plan:   Acute on chronic pancreatitis likely in the setting of recent alcohol use.  Continue supportive care with IV fluids and as needed analgesia  Patient on Zenpep outpatient, agree with restarting Creon  Add bowel regimen including MiraLAX and Senokot  Dicyclomine and simethicone as needed with history of IBS  Encouraged her to try clear liquids again this morning and if tolerated can be advanced to GI soft diet.  If she is doing better this evening okay for discharge home from a GI standpoint.  Will plan for outpatient MRI in 6 to 8 weeks.  Will plan for outpatient follow-up with me in 3 to 4 weeks.  GI will sign off.    I discussed the patient's findings and my recommendations with patient.    Dictated utilizing Dragon dictation.        Arlyn Wong PA-C   [facility]  [address] [place] [address]  Office: [phone]

## 2024-11-09 LAB
ANION GAP SERPL CALCULATED.3IONS-SCNC: 10.8 MMOL/L (ref 5–15)
BASOPHILS # BLD AUTO: 0.02 10*3/MM3 (ref 0–0.2)
BASOPHILS NFR BLD AUTO: 0.3 % (ref 0–1.5)
BUN SERPL-MCNC: <2 MG/DL (ref 6–20)
BUN/CREAT SERPL: ABNORMAL
CALCIUM SPEC-SCNC: 8.5 MG/DL (ref 8.6–10.5)
CHLORIDE SERPL-SCNC: 102 MMOL/L (ref 98–107)
CO2 SERPL-SCNC: 26.2 MMOL/L (ref 22–29)
CREAT SERPL-MCNC: 0.38 MG/DL (ref 0.57–1)
DEPRECATED RDW RBC AUTO: 40.5 FL (ref 37–54)
EGFRCR SERPLBLD CKD-EPI 2021: 123 ML/MIN/1.73
EOSINOPHIL # BLD AUTO: 0.33 10*3/MM3 (ref 0–0.4)
EOSINOPHIL NFR BLD AUTO: 5.2 % (ref 0.3–6.2)
ERYTHROCYTE [DISTWIDTH] IN BLOOD BY AUTOMATED COUNT: 11.2 % (ref 12.3–15.4)
GLUCOSE SERPL-MCNC: 96 MG/DL (ref 65–99)
HCT VFR BLD AUTO: 25.3 % (ref 34–46.6)
HGB BLD-MCNC: 8.5 G/DL (ref 12–15.9)
IMM GRANULOCYTES # BLD AUTO: 0.03 10*3/MM3 (ref 0–0.05)
IMM GRANULOCYTES NFR BLD AUTO: 0.5 % (ref 0–0.5)
LIPASE SERPL-CCNC: 343 U/L (ref 13–60)
LYMPHOCYTES # BLD AUTO: 1.11 10*3/MM3 (ref 0.7–3.1)
LYMPHOCYTES NFR BLD AUTO: 17.6 % (ref 19.6–45.3)
MCH RBC QN AUTO: 34.1 PG (ref 26.6–33)
MCHC RBC AUTO-ENTMCNC: 33.6 G/DL (ref 31.5–35.7)
MCV RBC AUTO: 101.6 FL (ref 79–97)
MONOCYTES # BLD AUTO: 0.66 10*3/MM3 (ref 0.1–0.9)
MONOCYTES NFR BLD AUTO: 10.5 % (ref 5–12)
NEUTROPHILS NFR BLD AUTO: 4.14 10*3/MM3 (ref 1.7–7)
NEUTROPHILS NFR BLD AUTO: 65.9 % (ref 42.7–76)
NRBC BLD AUTO-RTO: 0 /100 WBC (ref 0–0.2)
PLATELET # BLD AUTO: 179 10*3/MM3 (ref 140–450)
PMV BLD AUTO: 9.3 FL (ref 6–12)
POTASSIUM SERPL-SCNC: 3.4 MMOL/L (ref 3.5–5.2)
RBC # BLD AUTO: 2.49 10*6/MM3 (ref 3.77–5.28)
SODIUM SERPL-SCNC: 139 MMOL/L (ref 136–145)
WBC NRBC COR # BLD AUTO: 6.29 10*3/MM3 (ref 3.4–10.8)

## 2024-11-09 PROCEDURE — 80048 BASIC METABOLIC PNL TOTAL CA: CPT | Performed by: HOSPITALIST

## 2024-11-09 PROCEDURE — 83690 ASSAY OF LIPASE: CPT | Performed by: HOSPITALIST

## 2024-11-09 PROCEDURE — 25010000002 HYDROMORPHONE PER 4 MG: Performed by: HOSPITALIST

## 2024-11-09 PROCEDURE — 85025 COMPLETE CBC W/AUTO DIFF WBC: CPT | Performed by: HOSPITALIST

## 2024-11-09 PROCEDURE — 25010000002 SODIUM CHLORIDE 0.9 % WITH KCL 20 MEQ 20-0.9 MEQ/L-% SOLUTION: Performed by: HOSPITALIST

## 2024-11-09 RX ORDER — HYDROCODONE BITARTRATE AND ACETAMINOPHEN 5; 325 MG/1; MG/1
1 TABLET ORAL EVERY 4 HOURS PRN
Status: DISCONTINUED | OUTPATIENT
Start: 2024-11-09 | End: 2024-11-10

## 2024-11-09 RX ORDER — HYDROCODONE BITARTRATE AND ACETAMINOPHEN 5; 325 MG/1; MG/1
1 TABLET ORAL EVERY 6 HOURS PRN
Status: DISCONTINUED | OUTPATIENT
Start: 2024-11-09 | End: 2024-11-09

## 2024-11-09 RX ADMIN — SENNOSIDES AND DOCUSATE SODIUM 2 TABLET: 50; 8.6 TABLET ORAL at 09:10

## 2024-11-09 RX ADMIN — PANTOPRAZOLE SODIUM 40 MG: 40 TABLET, DELAYED RELEASE ORAL at 09:10

## 2024-11-09 RX ADMIN — POTASSIUM CHLORIDE AND SODIUM CHLORIDE 75 ML/HR: 900; 150 INJECTION, SOLUTION INTRAVENOUS at 20:36

## 2024-11-09 RX ADMIN — PANTOPRAZOLE SODIUM 40 MG: 40 TABLET, DELAYED RELEASE ORAL at 17:39

## 2024-11-09 RX ADMIN — POTASSIUM CHLORIDE AND SODIUM CHLORIDE 75 ML/HR: 900; 150 INJECTION, SOLUTION INTRAVENOUS at 00:23

## 2024-11-09 RX ADMIN — HYDROMORPHONE HYDROCHLORIDE 0.5 MG: 1 INJECTION, SOLUTION INTRAMUSCULAR; INTRAVENOUS; SUBCUTANEOUS at 02:18

## 2024-11-09 RX ADMIN — PANCRELIPASE 12000 UNITS OF LIPASE: 60000; 12000; 38000 CAPSULE, DELAYED RELEASE PELLETS ORAL at 11:12

## 2024-11-09 RX ADMIN — Medication 250 MG: at 09:10

## 2024-11-09 RX ADMIN — SIMETHICONE 80 MG: 80 TABLET, CHEWABLE ORAL at 04:08

## 2024-11-09 RX ADMIN — HYDROMORPHONE HYDROCHLORIDE 0.5 MG: 1 INJECTION, SOLUTION INTRAMUSCULAR; INTRAVENOUS; SUBCUTANEOUS at 11:04

## 2024-11-09 RX ADMIN — PANCRELIPASE 12000 UNITS OF LIPASE: 60000; 12000; 38000 CAPSULE, DELAYED RELEASE PELLETS ORAL at 17:39

## 2024-11-09 RX ADMIN — TRAZODONE HYDROCHLORIDE 50 MG: 50 TABLET ORAL at 20:34

## 2024-11-09 RX ADMIN — BUSPIRONE HYDROCHLORIDE 5 MG: 5 TABLET ORAL at 20:34

## 2024-11-09 RX ADMIN — BUSPIRONE HYDROCHLORIDE 5 MG: 5 TABLET ORAL at 16:40

## 2024-11-09 RX ADMIN — POLYETHYLENE GLYCOL 3350 17 G: 17 POWDER, FOR SOLUTION ORAL at 09:10

## 2024-11-09 RX ADMIN — DICYCLOMINE HYDROCHLORIDE 10 MG: 10 CAPSULE ORAL at 20:36

## 2024-11-09 RX ADMIN — BUSPIRONE HYDROCHLORIDE 5 MG: 5 TABLET ORAL at 09:10

## 2024-11-09 NOTE — PLAN OF CARE
Problem: Adult Inpatient Plan of Care  Goal: Plan of Care Review  Outcome: Progressing  Flowsheets (Taken 11/9/2024 1009)  Progress: improving  Outcome Evaluation: Patient is alert and oriented x4, up ad lisandra in room, VSS on room air and NSR on tele, no complaints of pain at this time. Patient is attempting to space out pain medication today. Patient is attempting to tolerate GI, low fat diet today- for breakfast could only tolerate liquids. Bowel sounds are present and normoactive. Patient is hoping to discharge soon,  Plan of Care Reviewed With: patient  Goal: Patient-Specific Goal (Individualized)  Outcome: Progressing  Flowsheets (Taken 11/9/2024 1009)  Patient/Family-Specific Goals (Include Timeframe): Patient will tolerate GI low fat diet for lunch and dinner during the shift.  Individualized Care Needs: Patient will have adequate pain control throughout the shift.  Anxieties, Fears or Concerns: Pain control  Goal: Absence of Hospital-Acquired Illness or Injury  Outcome: Progressing  Intervention: Identify and Manage Fall Risk  Recent Flowsheet Documentation  Taken 11/9/2024 1000 by Natalee Colon RN  Safety Promotion/Fall Prevention:   assistive device/personal items within reach   clutter free environment maintained   fall prevention program maintained   lighting adjusted   nonskid shoes/slippers when out of bed   room organization consistent   safety round/check completed   toileting scheduled  Taken 11/9/2024 0800 by Natalee Colon RN  Safety Promotion/Fall Prevention:   assistive device/personal items within reach   clutter free environment maintained   fall prevention program maintained   lighting adjusted   nonskid shoes/slippers when out of bed   room organization consistent   safety round/check completed   toileting scheduled  Intervention: Prevent Skin Injury  Recent Flowsheet Documentation  Taken 11/9/2024 1000 by Natalee Colon RN  Body Position:   position changed independently   sitting up in  bed   weight shifting  Taken 11/9/2024 0910 by Natalee Colon RN  Skin Protection: incontinence pads utilized  Taken 11/9/2024 0800 by Natalee Colon RN  Body Position:   position changed independently   weight shifting   supine   legs elevated  Intervention: Prevent and Manage VTE (Venous Thromboembolism) Risk  Recent Flowsheet Documentation  Taken 11/9/2024 0910 by Natalee Colon RN  VTE Prevention/Management: (Up ad lisandra in room)   bilateral   SCDs (sequential compression devices) off   patient refused intervention  Intervention: Prevent Infection  Recent Flowsheet Documentation  Taken 11/9/2024 1000 by Natalee Colon RN  Infection Prevention:   environmental surveillance performed   equipment surfaces disinfected   hand hygiene promoted   personal protective equipment utilized   rest/sleep promoted   single patient room provided  Taken 11/9/2024 0800 by Natalee Colon RN  Infection Prevention:   environmental surveillance performed   equipment surfaces disinfected   hand hygiene promoted   personal protective equipment utilized   rest/sleep promoted   single patient room provided  Goal: Optimal Comfort and Wellbeing  Outcome: Progressing  Intervention: Provide Person-Centered Care  Recent Flowsheet Documentation  Taken 11/9/2024 0910 by Natalee Colon RN  Trust Relationship/Rapport:   care explained   choices provided   emotional support provided   empathic listening provided   questions answered   questions encouraged   reassurance provided   thoughts/feelings acknowledged  Goal: Readiness for Transition of Care  Outcome: Progressing     Problem: Pancreatitis  Goal: Fluid and Electrolyte Balance  Outcome: Progressing  Goal: Absence of Infection Signs and Symptoms  Outcome: Progressing  Goal: Optimal Nutrition Delivery  Outcome: Progressing  Goal: Optimal Pain Control and Function  Outcome: Progressing  Intervention: Monitor and Manage Pain  Recent Flowsheet Documentation  Taken 11/9/2024 0910 by Natalee Colon  RN  Diversional Activities:   television   smartphone  Goal: Effective Oxygenation and Ventilation  Outcome: Progressing  Intervention: Optimize Oxygenation and Ventilation  Recent Flowsheet Documentation  Taken 11/9/2024 1000 by Natalee Colon RN  Activity Management: up ad lisandra  Head of Bed (HOB) Positioning: HOB at 30-45 degrees  Taken 11/9/2024 0910 by Natalee Colon RN  Cough And Deep Breathing: done independently per patient  Taken 11/9/2024 0800 by Natalee Colon RN  Activity Management: up ad lisandra  Head of Bed (HOB) Positioning: HOB at 20-30 degrees   Goal Outcome Evaluation:  Plan of Care Reviewed With: patient        Progress: improving  Outcome Evaluation: Patient is alert and oriented x4, up ad lisandra in room, VSS on room air and NSR on tele, no complaints of pain at this time. Patient is attempting to space out pain medication today. Patient is attempting to tolerate GI, low fat diet today- for breakfast could only tolerate liquids. Bowel sounds are present and normoactive. Patient is hoping to discharge soon,

## 2024-11-09 NOTE — PROGRESS NOTES
"Daily progress note    Primary care physician      Subjective  Doing better with no new complaints except abdominal discomfort but no more nausea vomiting    History of present illness  49-year-old white female with history of pancreatitis in the past anxiety disorder depression and chronic pancreatitis presented to [facility] emergency room with severe abdominal pain which radiated to the back associate with nausea but no vomiting diarrhea.  Patient denies any fever chills chest pain shortness of breath.  Patient workup in ER revealed acute on chronic pancreatitis admitted for management.  Patient stated that she drinks alcohol socially and had a drink couple of days ago.    REVIEW OF SYSTEMS  Unremarkable X abdominal pain      PHYSICAL EXAM   Blood pressure 126/89, pulse 95, temperature 97.5 °F (36.4 °C), temperature source Oral, resp. rate 18, height 157.5 cm (62.01\"), weight 55.2 kg (121 lb 12.8 oz), SpO2 99%, not currently breastfeeding.    GENERAL: Awake and alert in no distress   SKIN: Warm, dry  HENT: Normocephalic, atraumatic  EYES: no scleral icterus  CV: regular rhythm, regular rate  RESPIRATORY: normal effort, lungs clear  ABDOMEN: soft, mild epigastric tenderness bowel sounds positive  MUSCULOSKELETAL: no deformity  NEURO: alert, moves all extremities, follows commands     LAB RESULTS  Lab Results (last 24 hours)       Procedure Component Value Units Date/Time    Basic Metabolic Panel [[id number]]  (Abnormal) Collected: 11/09/24 0740    Specimen: Blood Updated: 11/09/24 0901     Glucose 96 mg/dL      BUN <2 mg/dL      Creatinine 0.38 mg/dL      Sodium 139 mmol/L      Potassium 3.4 mmol/L      Chloride 102 mmol/L      CO2 26.2 mmol/L      Calcium 8.5 mg/dL      BUN/Creatinine Ratio --     Comment: Unable to calculate Bun/Crea Ratio.        Anion Gap 10.8 mmol/L      eGFR 123.0 mL/min/1.73     Narrative:      GFR Normal >60  Chronic Kidney Disease <60  Kidney Failure <15      Lipase " [[id number]]  (Abnormal) Collected: 11/09/24 0445    Specimen: Blood Updated: 11/09/24 0719     Lipase 343 U/L     CBC & Differential [[id number]]  (Abnormal) Collected: 11/09/24 0445    Specimen: Blood Updated: 11/09/24 0700    Narrative:      The following orders were created for panel order CBC & Differential.  Procedure                               Abnormality         Status                     ---------                               -----------         ------                     CBC Auto Differential[[id number]]        Abnormal            Final result                 Please view results for these tests on the individual orders.    CBC Auto Differential [[id number]]  (Abnormal) Collected: 11/09/24 0445    Specimen: Blood Updated: 11/09/24 0700     WBC 6.29 10*3/mm3      RBC 2.49 10*6/mm3      Hemoglobin 8.5 g/dL      Hematocrit 25.3 %      .6 fL      MCH 34.1 pg      MCHC 33.6 g/dL      RDW 11.2 %      RDW-SD 40.5 fl      MPV 9.3 fL      Platelets 179 10*3/mm3      Neutrophil % 65.9 %      Lymphocyte % 17.6 %      Monocyte % 10.5 %      Eosinophil % 5.2 %      Basophil % 0.3 %      Immature Grans % 0.5 %      Neutrophils, Absolute 4.14 10*3/mm3      Lymphocytes, Absolute 1.11 10*3/mm3      Monocytes, Absolute 0.66 10*3/mm3      Eosinophils, Absolute 0.33 10*3/mm3      Basophils, Absolute 0.02 10*3/mm3      Immature Grans, Absolute 0.03 10*3/mm3      nRBC 0.0 /100 WBC     Blood Culture - Blood, Arm, Right [[id number]]  (Normal) Collected: 11/05/24 1811    Specimen: Blood from Arm, Right Updated: 11/08/24 1831     Blood Culture No growth at 3 days    Narrative:      Less than seven (7) mL's of blood was collected.  Insufficient quantity may yield false negative results.    Blood Culture - Blood, Arm, Left [[id number]]  (Normal) Collected: 11/05/24 1811    Specimen: Blood from Arm, Left Updated: 11/08/24 1831     Blood Culture No growth at 3 days    Narrative:      Less than seven (7) mL's of blood was collected.   Insufficient quantity may yield false negative results.          Imaging Results (Last 24 Hours)       ** No results found for the last 24 hours. **            Current Facility-Administered Medications:     busPIRone (BUSPAR) tablet 5 mg, 5 mg, Oral, TID, Wander Hyatt MD, 5 mg at 11/09/24 0910    dicyclomine (BENTYL) capsule 10 mg, 10 mg, Oral, 4x Daily PRN, ClinArlyn tabor PA-C, 10 mg at 11/08/24 2150    HYDROmorphone (DILAUDID) injection 0.5 mg, 0.5 mg, Intravenous, Q3H PRN, Wander Hyatt MD, 0.5 mg at 11/09/24 1104    ondansetron (ZOFRAN) injection 4 mg, 4 mg, Intravenous, Q4H PRN, Wander Hyatt MD, 4 mg at 11/07/24 0837    pancrelipase (Lip-Prot-Amyl) (CREON) capsule 12,000 units of lipase, 12,000 units of lipase, Oral, TID With Meals, Wander Hyatt MD, 12,000 units of lipase at 11/09/24 1112    pantoprazole (PROTONIX) EC tablet 40 mg, 40 mg, Oral, BID AC, Wander Hyatt MD, 40 mg at 11/09/24 0910    polyethylene glycol (MIRALAX) packet 17 g, 17 g, Oral, Daily, ClinAlryn tabor PA-C, 17 g at 11/09/24 0910    saccharomyces boulardii (FLORASTOR) capsule 250 mg, 250 mg, Oral, Daily, Wander Hyatt MD, 250 mg at 11/09/24 0910    sennosides-docusate (PERICOLACE) 8.6-50 MG per tablet 2 tablet, 2 tablet, Oral, Daily, Arlyn Wong PA-C, 2 tablet at 11/09/24 0910    simethicone (MYLICON) chewable tablet 80 mg, 80 mg, Oral, TID PRN, Arlyn Wong PA-C, 80 mg at 11/09/24 0408    sodium chloride 0.9 % with KCl 20 mEq/L infusion, 75 mL/hr, Intravenous, Continuous, Wander Hyatt MD, Last Rate: 75 mL/hr at 11/09/24 0023, 75 mL/hr at 11/09/24 0023    traZODone (DESYREL) tablet 50 mg, 50 mg, Oral, Nightly, Wander Hyatt MD, 50 mg at 11/08/24 2021     ASSESSMENT  Acute on chronic pancreatitis resolving  Anxiety disorder  Depression    PLAN  CPM  Continue IVF  Diet as tolerated  GI consult appreciated  Continue home medications  Stress ulcer DVT prophylaxis  Supportive care  Discussed with nursing  staff  Discharge home in a.m. if tolerating regular diet and no abdominal pain    CARMITA ALDRICH MD    Copied text in this note has been reviewed and is accurate as of 11/09/24

## 2024-11-09 NOTE — PLAN OF CARE
Goal Outcome Evaluation:  Plan of Care Reviewed With: patient        Progress: improving  Outcome Evaluation: Pt is alert and oriented. Diet was changed to clear liquids but abdomen swelled some so she refused any more.  Bowel sounds are present but hypoactive, pt is passing some air. Pain is controlled with pain medication.

## 2024-11-10 VITALS
BODY MASS INDEX: 22.41 KG/M2 | WEIGHT: 121.8 LBS | HEIGHT: 62 IN | OXYGEN SATURATION: 94 % | HEART RATE: 64 BPM | TEMPERATURE: 99 F | SYSTOLIC BLOOD PRESSURE: 139 MMHG | DIASTOLIC BLOOD PRESSURE: 92 MMHG | RESPIRATION RATE: 18 BRPM

## 2024-11-10 LAB
ANION GAP SERPL CALCULATED.3IONS-SCNC: 10.2 MMOL/L (ref 5–15)
BACTERIA SPEC AEROBE CULT: NORMAL
BACTERIA SPEC AEROBE CULT: NORMAL
BASOPHILS # BLD AUTO: 0.03 10*3/MM3 (ref 0–0.2)
BASOPHILS NFR BLD AUTO: 0.5 % (ref 0–1.5)
BUN SERPL-MCNC: <2 MG/DL (ref 6–20)
BUN/CREAT SERPL: ABNORMAL
CALCIUM SPEC-SCNC: 8.7 MG/DL (ref 8.6–10.5)
CHLORIDE SERPL-SCNC: 101 MMOL/L (ref 98–107)
CO2 SERPL-SCNC: 27.8 MMOL/L (ref 22–29)
CREAT SERPL-MCNC: 0.49 MG/DL (ref 0.57–1)
DEPRECATED RDW RBC AUTO: 41.8 FL (ref 37–54)
EGFRCR SERPLBLD CKD-EPI 2021: 115.7 ML/MIN/1.73
EOSINOPHIL # BLD AUTO: 0.16 10*3/MM3 (ref 0–0.4)
EOSINOPHIL NFR BLD AUTO: 2.7 % (ref 0.3–6.2)
ERYTHROCYTE [DISTWIDTH] IN BLOOD BY AUTOMATED COUNT: 11.5 % (ref 12.3–15.4)
GLUCOSE SERPL-MCNC: 116 MG/DL (ref 65–99)
HCT VFR BLD AUTO: 32.1 % (ref 34–46.6)
HGB BLD-MCNC: 10.8 G/DL (ref 12–15.9)
IMM GRANULOCYTES # BLD AUTO: 0.02 10*3/MM3 (ref 0–0.05)
IMM GRANULOCYTES NFR BLD AUTO: 0.3 % (ref 0–0.5)
LIPASE SERPL-CCNC: 877 U/L (ref 13–60)
LYMPHOCYTES # BLD AUTO: 1.29 10*3/MM3 (ref 0.7–3.1)
LYMPHOCYTES NFR BLD AUTO: 21.8 % (ref 19.6–45.3)
MCH RBC QN AUTO: 34.3 PG (ref 26.6–33)
MCHC RBC AUTO-ENTMCNC: 33.6 G/DL (ref 31.5–35.7)
MCV RBC AUTO: 101.9 FL (ref 79–97)
MONOCYTES # BLD AUTO: 0.71 10*3/MM3 (ref 0.1–0.9)
MONOCYTES NFR BLD AUTO: 12 % (ref 5–12)
NEUTROPHILS NFR BLD AUTO: 3.72 10*3/MM3 (ref 1.7–7)
NEUTROPHILS NFR BLD AUTO: 62.7 % (ref 42.7–76)
NRBC BLD AUTO-RTO: 0 /100 WBC (ref 0–0.2)
PLATELET # BLD AUTO: 245 10*3/MM3 (ref 140–450)
PMV BLD AUTO: 9 FL (ref 6–12)
POTASSIUM SERPL-SCNC: 3.6 MMOL/L (ref 3.5–5.2)
RBC # BLD AUTO: 3.15 10*6/MM3 (ref 3.77–5.28)
SODIUM SERPL-SCNC: 139 MMOL/L (ref 136–145)
WBC NRBC COR # BLD AUTO: 5.93 10*3/MM3 (ref 3.4–10.8)

## 2024-11-10 PROCEDURE — 83690 ASSAY OF LIPASE: CPT | Performed by: HOSPITALIST

## 2024-11-10 PROCEDURE — 80048 BASIC METABOLIC PNL TOTAL CA: CPT | Performed by: HOSPITALIST

## 2024-11-10 PROCEDURE — 85025 COMPLETE CBC W/AUTO DIFF WBC: CPT | Performed by: HOSPITALIST

## 2024-11-10 RX ORDER — AMOXICILLIN 250 MG
2 CAPSULE ORAL DAILY
Qty: 60 TABLET | Refills: 0 | Status: SHIPPED | OUTPATIENT
Start: 2024-11-11 | End: 2024-12-11

## 2024-11-10 RX ORDER — POLYETHYLENE GLYCOL 3350 17 G/17G
17 POWDER, FOR SOLUTION ORAL DAILY
Qty: 510 G | Refills: 0 | Status: SHIPPED | OUTPATIENT
Start: 2024-11-11 | End: 2024-12-11

## 2024-11-10 RX ADMIN — PANCRELIPASE 12000 UNITS OF LIPASE: 60000; 12000; 38000 CAPSULE, DELAYED RELEASE PELLETS ORAL at 11:56

## 2024-11-10 RX ADMIN — SENNOSIDES AND DOCUSATE SODIUM 2 TABLET: 50; 8.6 TABLET ORAL at 09:17

## 2024-11-10 RX ADMIN — PANCRELIPASE 12000 UNITS OF LIPASE: 60000; 12000; 38000 CAPSULE, DELAYED RELEASE PELLETS ORAL at 08:00

## 2024-11-10 RX ADMIN — Medication 250 MG: at 09:20

## 2024-11-10 RX ADMIN — BUSPIRONE HYDROCHLORIDE 5 MG: 5 TABLET ORAL at 09:17

## 2024-11-10 RX ADMIN — POLYETHYLENE GLYCOL 3350 17 G: 17 POWDER, FOR SOLUTION ORAL at 09:17

## 2024-11-10 RX ADMIN — PANTOPRAZOLE SODIUM 40 MG: 40 TABLET, DELAYED RELEASE ORAL at 06:46

## 2024-11-10 NOTE — PROGRESS NOTES
"Daily progress note    Primary care physician      Subjective  Doing better with no new complaints and tolerating regular diet with no more abdominal pain nausea vomiting and wants to go home.  Patient family at bedside    History of present illness  49-year-old white female with history of pancreatitis in the past anxiety disorder depression and chronic pancreatitis presented to [facility] emergency room with severe abdominal pain which radiated to the back associate with nausea but no vomiting diarrhea.  Patient denies any fever chills chest pain shortness of breath.  Patient workup in ER revealed acute on chronic pancreatitis admitted for management.  Patient stated that she drinks alcohol socially and had a drink couple of days ago.    REVIEW OF SYSTEMS  Unremarkable     PHYSICAL EXAM   Blood pressure 139/92, pulse 64, temperature 99 °F (37.2 °C), temperature source Oral, resp. rate 18, height 157.5 cm (62.01\"), weight 55.2 kg (121 lb 12.8 oz), SpO2 94%, not currently breastfeeding.    GENERAL: Awake and alert in no distress   SKIN: Warm, dry  HENT: Normocephalic, atraumatic  EYES: no scleral icterus  CV: regular rhythm, regular rate  RESPIRATORY: normal effort, lungs clear  ABDOMEN: soft, mild epigastric tenderness bowel sounds positive  MUSCULOSKELETAL: no deformity  NEURO: alert, moves all extremities, follows commands     LAB RESULTS  Lab Results (last 24 hours)       Procedure Component Value Units Date/Time    Lipase [[id number]]  (Abnormal) Collected: 11/10/24 0456    Specimen: Blood Updated: 11/10/24 0643     Lipase 877 U/L     Basic Metabolic Panel [[id number]]  (Abnormal) Collected: 11/10/24 0456    Specimen: Blood Updated: 11/10/24 0636     Glucose 116 mg/dL      BUN <2 mg/dL      Creatinine 0.49 mg/dL      Sodium 139 mmol/L      Potassium 3.6 mmol/L      Chloride 101 mmol/L      CO2 27.8 mmol/L      Calcium 8.7 mg/dL      BUN/Creatinine Ratio --     Comment: Unable to calculate " Bun/Crea Ratio.        Anion Gap 10.2 mmol/L      eGFR 115.7 mL/min/1.73     Narrative:      GFR Normal >60  Chronic Kidney Disease <60  Kidney Failure <15      CBC & Differential [[id number]]  (Abnormal) Collected: 11/10/24 0456    Specimen: Blood Updated: 11/10/24 0624    Narrative:      The following orders were created for panel order CBC & Differential.  Procedure                               Abnormality         Status                     ---------                               -----------         ------                     CBC Auto Differential[[id number]]        Abnormal            Final result                 Please view results for these tests on the individual orders.    CBC Auto Differential [[id number]]  (Abnormal) Collected: 11/10/24 0456    Specimen: Blood Updated: 11/10/24 0624     WBC 5.93 10*3/mm3      RBC 3.15 10*6/mm3      Hemoglobin 10.8 g/dL      Hematocrit 32.1 %      .9 fL      MCH 34.3 pg      MCHC 33.6 g/dL      RDW 11.5 %      RDW-SD 41.8 fl      MPV 9.0 fL      Platelets 245 10*3/mm3      Neutrophil % 62.7 %      Lymphocyte % 21.8 %      Monocyte % 12.0 %      Eosinophil % 2.7 %      Basophil % 0.5 %      Immature Grans % 0.3 %      Neutrophils, Absolute 3.72 10*3/mm3      Lymphocytes, Absolute 1.29 10*3/mm3      Monocytes, Absolute 0.71 10*3/mm3      Eosinophils, Absolute 0.16 10*3/mm3      Basophils, Absolute 0.03 10*3/mm3      Immature Grans, Absolute 0.02 10*3/mm3      nRBC 0.0 /100 WBC     Blood Culture - Blood, Arm, Right [[id number]]  (Normal) Collected: 11/05/24 1811    Specimen: Blood from Arm, Right Updated: 11/09/24 1831     Blood Culture No growth at 4 days    Narrative:      Less than seven (7) mL's of blood was collected.  Insufficient quantity may yield false negative results.    Blood Culture - Blood, Arm, Left [[id number]]  (Normal) Collected: 11/05/24 1811    Specimen: Blood from Arm, Left Updated: 11/09/24 1831     Blood Culture No growth at 4 days    Narrative:       Less than seven (7) mL's of blood was collected.  Insufficient quantity may yield false negative results.          Imaging Results (Last 24 Hours)       ** No results found for the last 24 hours. **            Current Facility-Administered Medications:     busPIRone (BUSPAR) tablet 5 mg, 5 mg, Oral, TID, Carmita Hyatt MD, 5 mg at 11/10/24 0917    pancrelipase (Lip-Prot-Amyl) (CREON) capsule 12,000 units of lipase, 12,000 units of lipase, Oral, TID With Meals, Carmita Hyatt MD, 12,000 units of lipase at 11/10/24 1156    pantoprazole (PROTONIX) EC tablet 40 mg, 40 mg, Oral, BID AC, Carmita Hyatt MD, 40 mg at 11/10/24 0646    polyethylene glycol (MIRALAX) packet 17 g, 17 g, Oral, Daily, ClinArlyn tabor PA-C, 17 g at 11/10/24 0917    saccharomyces boulardii (FLORASTOR) capsule 250 mg, 250 mg, Oral, Daily, Carmita Hyatt MD, 250 mg at 11/10/24 0920    sennosides-docusate (PERICOLACE) 8.6-50 MG per tablet 2 tablet, 2 tablet, Oral, Daily, Arlyn Wong PA-C, 2 tablet at 11/10/24 0917    traZODone (DESYREL) tablet 50 mg, 50 mg, Oral, Nightly, Carmita Hyatt MD, 50 mg at 11/09/24 2034     ASSESSMENT  Acute on chronic pancreatitis resolved  Anxiety disorder  Depression    PLAN  Discharge home  Discharge summary dictated    CARMITA HYATT MD    Copied text in this note has been reviewed and is accurate as of 11/10/24

## 2024-11-10 NOTE — DISCHARGE SUMMARY
Discharge summary    Date of admission 11/5/2024  Date of discharge 11/10/2024    Final diagnosis  Acute on chronic pancreatitis resolved  Anxiety disorder  Depression    Discharge medications    Current Facility-Administered Medications:     busPIRone (BUSPAR) tablet 5 mg, 5 mg, Oral, TID, Wander Hyatt MD, 5 mg at 11/10/24 0917    pancrelipase (Lip-Prot-Amyl) (CREON) capsule 12,000 units of lipase, 12,000 units of lipase, Oral, TID With Meals, Wander Hyatt MD, 12,000 units of lipase at 11/10/24 1156    pantoprazole (PROTONIX) EC tablet 40 mg, 40 mg, Oral, BID AC, Wander Hyatt MD, 40 mg at 11/10/24 0646    polyethylene glycol (MIRALAX) packet 17 g, 17 g, Oral, Daily, ClinArlyn tabor PA-C, 17 g at 11/10/24 0917    saccharomyces boulardii (FLORASTOR) capsule 250 mg, 250 mg, Oral, Daily, Wander Hyatt MD, 250 mg at 11/10/24 0920    sennosides-docusate (PERICOLACE) 8.6-50 MG per tablet 2 tablet, 2 tablet, Oral, Daily, Arlyn Wong PA-C, 2 tablet at 11/10/24 0917    traZODone (DESYREL) tablet 50 mg, 50 mg, Oral, Nightly, Wander Hyatt MD, 50 mg at 11/09/24 2034     Consults obtained  Gastroenterology    Procedures  None    Hospital course  49-year-old white female with history of chronic pancreatitis anxiety disorder and depression admitted to emergency room with severe abdominal pain radiate to the back associate with nausea.  Patient workup in ER revealed acute on chronic pancreatitis secondary to alcohol use admitted for management.  Patient admitted treated with bowel rest IV fluids and symptomatic treatment for pain and nausea.  Patient further evaluated by gastroenterology and they recommend outpatient MRI of the abdomen in 6 to 8 weeks.  Patient responded to the treatment started on liquid diet and advance as tolerated.  Patient now tolerating regular diet with no nausea no pain and wants to go home.    Discharge diet regular    Activity as tolerated    Medication as above    Follow-up with [facility]  doctor in 1 week and follow-up with gastroenterology per the instructions and take medication as directed    CARMITA ALDRICH MD

## 2024-11-10 NOTE — PLAN OF CARE
Problem: Adult Inpatient Plan of Care  Goal: Plan of Care Review  11/10/2024 0423 by Stephanie Prince RN  Outcome: Progressing  Flowsheets (Taken 11/10/2024 0423)  Progress: improving  Outcome Evaluation:   No s/sx of distress noted at this time. Rested some throughout night. Vital signs WDL. On room air   tolerated. IV fluids cont per orders. Will cont with current plan of care.  Plan of Care Reviewed With: patient  11/10/2024 0422 by Stephanie Prince RN  Outcome: Progressing  Flowsheets (Taken 11/10/2024 0422)  Progress: improving  Goal: Patient-Specific Goal (Individualized)  11/10/2024 0423 by Stephanie Prince RN  Outcome: Progressing  11/10/2024 0422 by Stephanie Prince RN  Outcome: Progressing  Goal: Absence of Hospital-Acquired Illness or Injury  11/10/2024 0423 by Stephanie Prince RN  Outcome: Progressing  11/10/2024 0422 by Stephanie Prince RN  Outcome: Progressing  Intervention: Identify and Manage Fall Risk  Recent Flowsheet Documentation  Taken 11/10/2024 0230 by Stephanie Prince RN  Safety Promotion/Fall Prevention:   activity supervised   assistive device/personal items within reach   clutter free environment maintained   fall prevention program maintained   lighting adjusted   nonskid shoes/slippers when out of bed   room organization consistent   safety round/check completed  Taken 11/10/2024 0018 by Stephanie Prince RN  Safety Promotion/Fall Prevention:   activity supervised   assistive device/personal items within reach   clutter free environment maintained   fall prevention program maintained   lighting adjusted   nonskid shoes/slippers when out of bed   room organization consistent   safety round/check completed  Taken 11/9/2024 2211 by Stephanie Prince RN  Safety Promotion/Fall Prevention:   activity supervised   assistive device/personal items within reach   clutter free environment maintained   fall prevention program maintained   lighting adjusted   nonskid shoes/slippers when out of bed   room organization  consistent   safety round/check completed  Taken 11/9/2024 2026 by Stephanie Prince RN  Safety Promotion/Fall Prevention:   activity supervised   clutter free environment maintained   assistive device/personal items within reach   fall prevention program maintained   lighting adjusted   nonskid shoes/slippers when out of bed   room organization consistent   safety round/check completed  Intervention: Prevent Skin Injury  Recent Flowsheet Documentation  Taken 11/10/2024 0230 by Stephanie Prince RN  Body Position:   right   side-lying  Taken 11/10/2024 0018 by Stephanie Prince RN  Body Position:   position changed independently   left   side-lying  Taken 11/9/2024 2211 by Stephanie Prince RN  Body Position: position changed independently  Taken 11/9/2024 2026 by Stephanie Prince RN  Body Position:   position changed independently   right   side-lying  Skin Protection: transparent dressing maintained  Intervention: Prevent Infection  Recent Flowsheet Documentation  Taken 11/10/2024 0230 by Stephanie Prince RN  Infection Prevention:   hand hygiene promoted   rest/sleep promoted   single patient room provided  Taken 11/10/2024 0018 by Stephanie Prince RN  Infection Prevention:   hand hygiene promoted   rest/sleep promoted   single patient room provided  Taken 11/9/2024 2211 by Stephanie Prince RN  Infection Prevention:   hand hygiene promoted   rest/sleep promoted   single patient room provided  Taken 11/9/2024 2026 by Stephanie Prince RN  Infection Prevention:   hand hygiene promoted   rest/sleep promoted   single patient room provided  Goal: Optimal Comfort and Wellbeing  11/10/2024 0423 by Stephanie Prince RN  Outcome: Progressing  11/10/2024 0422 by Stephanie Prince RN  Outcome: Progressing  Intervention: Provide Person-Centered Care  Recent Flowsheet Documentation  Taken 11/9/2024 2026 by Stephanie Prince RN  Trust Relationship/Rapport:   care explained   reassurance provided   thoughts/feelings acknowledged  Goal: Readiness for Transition of  Care  11/10/2024 0423 by Stephanie Prince RN  Outcome: Progressing  11/10/2024 0422 by Stephanie Prince RN  Outcome: Progressing     Problem: Pancreatitis  Goal: Fluid and Electrolyte Balance  11/10/2024 0423 by Stephanie Prince RN  Outcome: Progressing  11/10/2024 0422 by Stephanie Prince RN  Outcome: Progressing  Goal: Absence of Infection Signs and Symptoms  11/10/2024 0423 by Stephanie Prince RN  Outcome: Progressing  11/10/2024 0422 by Stephanie Prince RN  Outcome: Progressing  Goal: Optimal Nutrition Delivery  11/10/2024 0423 by Stephanie Prince RN  Outcome: Progressing  11/10/2024 0422 by Stephanie Prince RN  Outcome: Progressing  Goal: Optimal Pain Control and Function  11/10/2024 0423 by Stephanie Prince RN  Outcome: Progressing  11/10/2024 0422 by Stephanie Prince RN  Outcome: Progressing  Intervention: Monitor and Manage Pain  Recent Flowsheet Documentation  Taken 11/9/2024 2026 by Stephanie Prince RN  Sleep/Rest Enhancement:   awakenings minimized   regular sleep/rest pattern promoted   relaxation techniques promoted   room darkened  Goal: Effective Oxygenation and Ventilation  11/10/2024 0423 by Stephanie Prince RN  Outcome: Progressing  11/10/2024 0422 by Stephanie Prince RN  Outcome: Progressing  Intervention: Optimize Oxygenation and Ventilation  Recent Flowsheet Documentation  Taken 11/10/2024 0230 by Stephanie Prince RN  Activity Management: up ad lisandra  Taken 11/10/2024 0018 by Stephanie Prince RN  Activity Management: up ad lisandra  Head of Bed (HOB) Positioning: HOB at 20-30 degrees  Taken 11/9/2024 2211 by Stephanie Prince RN  Activity Management: up ad lisandra  Taken 11/9/2024 2026 by Stephanie Prince RN  Activity Management: up ad lisandra  Head of Bed (HOB) Positioning: HOB at 20-30 degrees  Cough And Deep Breathing: done independently per patient   Goal Outcome Evaluation:  Plan of Care Reviewed With: patient        Progress: improving  Outcome Evaluation: No s/sx of distress noted at this time. Rested some throughout night. Vital signs WDL. On room air;  tolerated. IV fluids cont per orders. Will cont with current plan of care.

## 2024-11-10 NOTE — PLAN OF CARE
Goal Outcome Evaluation:      This patient is being discharged to home ambulatory. Calm affect. Transported by wheel chair.

## 2024-11-11 ENCOUNTER — READMISSION MANAGEMENT (OUTPATIENT)
Dept: CALL CENTER | Facility: HOSPITAL | Age: 49
End: 2024-11-11
Payer: COMMERCIAL

## 2024-11-11 NOTE — OUTREACH NOTE
Prep Survey      Flowsheet Row Responses   [facility] patient discharged from? [place]   Is LACE score < 7 ? No   Eligibility Readm Mgmt   Discharge diagnosis Acute pancreatitis   Does the patient have one of the following disease processes/diagnoses(primary or secondary)? Other   Does the patient have Home health ordered? No   Is there a DME ordered? No   Medication alerts for this patient see avs   Prep survey completed? Yes            Jes LINDQUIST - Registered Nurse

## 2024-11-12 ENCOUNTER — TELEPHONE (OUTPATIENT)
Dept: OBSTETRICS AND GYNECOLOGY | Age: 49
End: 2024-11-12
Payer: COMMERCIAL

## 2024-11-12 NOTE — PAYOR COMM NOTE
"Abi Henriquez (49 y.o. Female)        PLEASE SEE ATTACHED FOR CONTINUED STAY REVIEW AND DC SUMMARY    REF#[id number]     PLEASE CALL   OR  [phone]      THANK YOU    RUBEN LUCAS LPN CCP   Date of Birth   1975    Social Security Number       Address   [address] [place] [address]    Home Phone   [phone]    MRN   [id number]       Christianity   Restorationist    Marital Status                               Admission Date   11/5/24    Admission Type   Emergency    Admitting Provider   Wander Hyatt MD    Attending Provider       Department, Room/Bed   [address], [address]/1       Discharge Date   11/10/2024    Discharge Disposition   Home or Self Care    Discharge Destination   Home                              Attending Provider: (none)   Allergies: Lorazepam, Codeine    Isolation: None   Infection: None   Code Status: Prior    Ht: 157.5 cm (62.01\")   Wt: 55.2 kg (121 lb 12.8 oz)    Admission Cmt: None   Principal Problem: Acute pancreatitis [K85.90]                   Active Insurance as of 11/5/2024       Primary Coverage       Payor Plan Insurance Group Employer/Plan Group    [employer] BLUE CROSS [employer] BLUE CROSS BLUE SHIELD PPO [id number]       Payor Plan Address Payor Plan Phone Number Payor Plan Fax Number Effective Dates    [address] [phone]  6/1/2020 - None Entered    [place] [id number]         Subscriber Name Subscriber Birth Date Member ID       ABI HENRIQUEZ 1975 [id number]                     Emergency Contacts        (Rel.) Home Phone Work Phone [place] Phone    Shereen Anglin (Daughter) -- -- [phone]                 Physician Progress Notes (last 72 hours)        Wander Hyatt MD at 11/10/24 0964          Daily progress note    Primary care physician      Subjective  Doing better with no new complaints and tolerating regular diet with no more abdominal pain nausea vomiting and wants to go home.  " "Patient family at bedside    History of present illness  49-year-old white female with history of pancreatitis in the past anxiety disorder depression and chronic pancreatitis presented to [facility] emergency room with severe abdominal pain which radiated to the back associate with nausea but no vomiting diarrhea.  Patient denies any fever chills chest pain shortness of breath.  Patient workup in ER revealed acute on chronic pancreatitis admitted for management.  Patient stated that she drinks alcohol socially and had a drink couple of days ago.    REVIEW OF SYSTEMS  Unremarkable     PHYSICAL EXAM   Blood pressure 139/92, pulse 64, temperature 99 °F (37.2 °C), temperature source Oral, resp. rate 18, height 157.5 cm (62.01\"), weight 55.2 kg (121 lb 12.8 oz), SpO2 94%, not currently breastfeeding.    GENERAL: Awake and alert in no distress   SKIN: Warm, dry  HENT: Normocephalic, atraumatic  EYES: no scleral icterus  CV: regular rhythm, regular rate  RESPIRATORY: normal effort, lungs clear  ABDOMEN: soft, mild epigastric tenderness bowel sounds positive  MUSCULOSKELETAL: no deformity  NEURO: alert, moves all extremities, follows commands     LAB RESULTS  Lab Results (last 24 hours)       Procedure Component Value Units Date/Time    Lipase [[id number]]  (Abnormal) Collected: 11/10/24 0456    Specimen: Blood Updated: 11/10/24 0643     Lipase 877 U/L     Basic Metabolic Panel [[id number]]  (Abnormal) Collected: 11/10/24 0456    Specimen: Blood Updated: 11/10/24 0636     Glucose 116 mg/dL      BUN <2 mg/dL      Creatinine 0.49 mg/dL      Sodium 139 mmol/L      Potassium 3.6 mmol/L      Chloride 101 mmol/L      CO2 27.8 mmol/L      Calcium 8.7 mg/dL      BUN/Creatinine Ratio --     Comment: Unable to calculate Bun/Crea Ratio.        Anion Gap 10.2 mmol/L      eGFR 115.7 mL/min/1.73     Narrative:      GFR Normal >60  Chronic Kidney Disease <60  Kidney Failure <15      CBC & Differential [[id number]]  (Abnormal) " Collected: 11/10/24 0456    Specimen: Blood Updated: 11/10/24 0624    Narrative:      The following orders were created for panel order CBC & Differential.  Procedure                               Abnormality         Status                     ---------                               -----------         ------                     CBC Auto Differential[[id number]]        Abnormal            Final result                 Please view results for these tests on the individual orders.    CBC Auto Differential [[id number]]  (Abnormal) Collected: 11/10/24 0456    Specimen: Blood Updated: 11/10/24 0624     WBC 5.93 10*3/mm3      RBC 3.15 10*6/mm3      Hemoglobin 10.8 g/dL      Hematocrit 32.1 %      .9 fL      MCH 34.3 pg      MCHC 33.6 g/dL      RDW 11.5 %      RDW-SD 41.8 fl      MPV 9.0 fL      Platelets 245 10*3/mm3      Neutrophil % 62.7 %      Lymphocyte % 21.8 %      Monocyte % 12.0 %      Eosinophil % 2.7 %      Basophil % 0.5 %      Immature Grans % 0.3 %      Neutrophils, Absolute 3.72 10*3/mm3      Lymphocytes, Absolute 1.29 10*3/mm3      Monocytes, Absolute 0.71 10*3/mm3      Eosinophils, Absolute 0.16 10*3/mm3      Basophils, Absolute 0.03 10*3/mm3      Immature Grans, Absolute 0.02 10*3/mm3      nRBC 0.0 /100 WBC     Blood Culture - Blood, Arm, Right [[id number]]  (Normal) Collected: 11/05/24 1811    Specimen: Blood from Arm, Right Updated: 11/09/24 1831     Blood Culture No growth at 4 days    Narrative:      Less than seven (7) mL's of blood was collected.  Insufficient quantity may yield false negative results.    Blood Culture - Blood, Arm, Left [[id number]]  (Normal) Collected: 11/05/24 1811    Specimen: Blood from Arm, Left Updated: 11/09/24 1831     Blood Culture No growth at 4 days    Narrative:      Less than seven (7) mL's of blood was collected.  Insufficient quantity may yield false negative results.          Imaging Results (Last 24 Hours)       ** No results found for the last 24 hours. **             Current Facility-Administered Medications:     busPIRone (BUSPAR) tablet 5 mg, 5 mg, Oral, TID, Carmita Hyatt MD, 5 mg at 11/10/24 0917    pancrelipase (Lip-Prot-Amyl) (CREON) capsule 12,000 units of lipase, 12,000 units of lipase, Oral, TID With Meals, Carmita Hyatt MD, 12,000 units of lipase at 11/10/24 1156    pantoprazole (PROTONIX) EC tablet 40 mg, 40 mg, Oral, BID AC, Carmita Hyatt MD, 40 mg at 11/10/24 0646    polyethylene glycol (MIRALAX) packet 17 g, 17 g, Oral, Daily, ClinArlyn tabor PA-C, 17 g at 11/10/24 0917    saccharomyces boulardii (FLORASTOR) capsule 250 mg, 250 mg, Oral, Daily, Carmita Hyatt MD, 250 mg at 11/10/24 0920    sennosides-docusate (PERICOLACE) 8.6-50 MG per tablet 2 tablet, 2 tablet, Oral, Daily, ClinArlyn tabor PA-C, 2 tablet at 11/10/24 0917    traZODone (DESYREL) tablet 50 mg, 50 mg, Oral, Nightly, Carmita Hyatt MD, 50 mg at 11/09/24 2034     ASSESSMENT  Acute on chronic pancreatitis resolved  Anxiety disorder  Depression    PLAN  Discharge home  Discharge summary dictated    CARMITA HYATT MD    Copied text in this note has been reviewed and is accurate as of 11/10/24               Electronically signed by Carmita Hyatt MD at 11/10/24 1249       Carmita Hyatt MD at 11/09/24 1429          Daily progress note    Primary care physician      Subjective  Doing better with no new complaints except abdominal discomfort but no more nausea vomiting    History of present illness  49-year-old white female with history of pancreatitis in the past anxiety disorder depression and chronic pancreatitis presented to [facility] emergency room with severe abdominal pain which radiated to the back associate with nausea but no vomiting diarrhea.  Patient denies any fever chills chest pain shortness of breath.  Patient workup in ER revealed acute on chronic pancreatitis admitted for management.  Patient stated that she drinks alcohol socially and had a drink couple of  "days ago.    REVIEW OF SYSTEMS  Unremarkable X abdominal pain      PHYSICAL EXAM   Blood pressure 126/89, pulse 95, temperature 97.5 °F (36.4 °C), temperature source Oral, resp. rate 18, height 157.5 cm (62.01\"), weight 55.2 kg (121 lb 12.8 oz), SpO2 99%, not currently breastfeeding.    GENERAL: Awake and alert in no distress   SKIN: Warm, dry  HENT: Normocephalic, atraumatic  EYES: no scleral icterus  CV: regular rhythm, regular rate  RESPIRATORY: normal effort, lungs clear  ABDOMEN: soft, mild epigastric tenderness bowel sounds positive  MUSCULOSKELETAL: no deformity  NEURO: alert, moves all extremities, follows commands     LAB RESULTS  Lab Results (last 24 hours)       Procedure Component Value Units Date/Time    Basic Metabolic Panel [[id number]]  (Abnormal) Collected: 11/09/24 0740    Specimen: Blood Updated: 11/09/24 0901     Glucose 96 mg/dL      BUN <2 mg/dL      Creatinine 0.38 mg/dL      Sodium 139 mmol/L      Potassium 3.4 mmol/L      Chloride 102 mmol/L      CO2 26.2 mmol/L      Calcium 8.5 mg/dL      BUN/Creatinine Ratio --     Comment: Unable to calculate Bun/Crea Ratio.        Anion Gap 10.8 mmol/L      eGFR 123.0 mL/min/1.73     Narrative:      GFR Normal >60  Chronic Kidney Disease <60  Kidney Failure <15      Lipase [[id number]]  (Abnormal) Collected: 11/09/24 0445    Specimen: Blood Updated: 11/09/24 0719     Lipase 343 U/L     CBC & Differential [[id number]]  (Abnormal) Collected: 11/09/24 0445    Specimen: Blood Updated: 11/09/24 0700    Narrative:      The following orders were created for panel order CBC & Differential.  Procedure                               Abnormality         Status                     ---------                               -----------         ------                     CBC Auto Differential[[id number]]        Abnormal            Final result                 Please view results for these tests on the individual orders.    CBC Auto Differential [[id number]]  (Abnormal) " Collected: 11/09/24 0445    Specimen: Blood Updated: 11/09/24 0700     WBC 6.29 10*3/mm3      RBC 2.49 10*6/mm3      Hemoglobin 8.5 g/dL      Hematocrit 25.3 %      .6 fL      MCH 34.1 pg      MCHC 33.6 g/dL      RDW 11.2 %      RDW-SD 40.5 fl      MPV 9.3 fL      Platelets 179 10*3/mm3      Neutrophil % 65.9 %      Lymphocyte % 17.6 %      Monocyte % 10.5 %      Eosinophil % 5.2 %      Basophil % 0.3 %      Immature Grans % 0.5 %      Neutrophils, Absolute 4.14 10*3/mm3      Lymphocytes, Absolute 1.11 10*3/mm3      Monocytes, Absolute 0.66 10*3/mm3      Eosinophils, Absolute 0.33 10*3/mm3      Basophils, Absolute 0.02 10*3/mm3      Immature Grans, Absolute 0.03 10*3/mm3      nRBC 0.0 /100 WBC     Blood Culture - Blood, Arm, Right [[id number]]  (Normal) Collected: 11/05/24 1811    Specimen: Blood from Arm, Right Updated: 11/08/24 1831     Blood Culture No growth at 3 days    Narrative:      Less than seven (7) mL's of blood was collected.  Insufficient quantity may yield false negative results.    Blood Culture - Blood, Arm, Left [[id number]]  (Normal) Collected: 11/05/24 1811    Specimen: Blood from Arm, Left Updated: 11/08/24 1831     Blood Culture No growth at 3 days    Narrative:      Less than seven (7) mL's of blood was collected.  Insufficient quantity may yield false negative results.          Imaging Results (Last 24 Hours)       ** No results found for the last 24 hours. **            Current Facility-Administered Medications:     busPIRone (BUSPAR) tablet 5 mg, 5 mg, Oral, TID, Wander Hyatt MD, 5 mg at 11/09/24 0910    dicyclomine (BENTYL) capsule 10 mg, 10 mg, Oral, 4x Daily PRN, Arlyn Wong PA-C, 10 mg at 11/08/24 2150    HYDROmorphone (DILAUDID) injection 0.5 mg, 0.5 mg, Intravenous, Q3H PRN, Wander Hyatt MD, 0.5 mg at 11/09/24 1104    ondansetron (ZOFRAN) injection 4 mg, 4 mg, Intravenous, Q4H PRN, Wander Hyatt MD, 4 mg at 11/07/24 0837    pancrelipase (Lip-Prot-Amyl) (CREON) capsule  12,000 units of lipase, 12,000 units of lipase, Oral, TID With Meals, Carmita Hyatt MD, 12,000 units of lipase at 11/09/24 1112    pantoprazole (PROTONIX) EC tablet 40 mg, 40 mg, Oral, BID AC, Carmita Hyatt MD, 40 mg at 11/09/24 0910    polyethylene glycol (MIRALAX) packet 17 g, 17 g, Oral, Daily, ClinArlyn tabor PA-C, 17 g at 11/09/24 0910    saccharomyces boulardii (FLORASTOR) capsule 250 mg, 250 mg, Oral, Daily, Carmita Hyatt MD, 250 mg at 11/09/24 0910    sennosides-docusate (PERICOLACE) 8.6-50 MG per tablet 2 tablet, 2 tablet, Oral, Daily, Arlyn Wong PA-C, 2 tablet at 11/09/24 0910    simethicone (MYLICON) chewable tablet 80 mg, 80 mg, Oral, TID PRN, Arlyn Wong PA-C, 80 mg at 11/09/24 0408    sodium chloride 0.9 % with KCl 20 mEq/L infusion, 75 mL/hr, Intravenous, Continuous, Carmita Hyatt MD, Last Rate: 75 mL/hr at 11/09/24 0023, 75 mL/hr at 11/09/24 0023    traZODone (DESYREL) tablet 50 mg, 50 mg, Oral, Nightly, Carmita Hyatt MD, 50 mg at 11/08/24 2021     ASSESSMENT  Acute on chronic pancreatitis resolving  Anxiety disorder  Depression    PLAN  CPM  Continue IVF  Diet as tolerated  GI consult appreciated  Continue home medications  Stress ulcer DVT prophylaxis  Supportive care  Discussed with nursing staff  Discharge home in a.m. if tolerating regular diet and no abdominal pain    CARMITA HYATT MD    Copied text in this note has been reviewed and is accurate as of 11/09/24               Electronically signed by Carmita Hyatt MD at 11/09/24 1430            Discharge Summary        Carmita Hyatt MD at 11/10/24 1250          Discharge summary    Date of admission 11/5/2024  Date of discharge 11/10/2024    Final diagnosis  Acute on chronic pancreatitis resolved  Anxiety disorder  Depression    Discharge medications    Current Facility-Administered Medications:     busPIRone (BUSPAR) tablet 5 mg, 5 mg, Oral, TID, Carmita Hyatt MD, 5 mg at 11/10/24 0917    pancrelipase (Lip-Prot-Amyl)  (CREON) capsule 12,000 units of lipase, 12,000 units of lipase, Oral, TID With Meals, Carmita Hyatt MD, 12,000 units of lipase at 11/10/24 1156    pantoprazole (PROTONIX) EC tablet 40 mg, 40 mg, Oral, BID AC, Carmita Hyatt MD, 40 mg at 11/10/24 0646    polyethylene glycol (MIRALAX) packet 17 g, 17 g, Oral, Daily, ClinArlyn tabor PA-C, 17 g at 11/10/24 0917    saccharomyces boulardii (FLORASTOR) capsule 250 mg, 250 mg, Oral, Daily, Carmita Hyatt MD, 250 mg at 11/10/24 0920    sennosides-docusate (PERICOLACE) 8.6-50 MG per tablet 2 tablet, 2 tablet, Oral, Daily, Arlyn Wong PA-C, 2 tablet at 11/10/24 0917    traZODone (DESYREL) tablet 50 mg, 50 mg, Oral, Nightly, Carmita Hyatt MD, 50 mg at 11/09/24 2034     Consults obtained  Gastroenterology    Procedures  None    Hospital course  49-year-old white female with history of chronic pancreatitis anxiety disorder and depression admitted to emergency room with severe abdominal pain radiate to the back associate with nausea.  Patient workup in ER revealed acute on chronic pancreatitis secondary to alcohol use admitted for management.  Patient admitted treated with bowel rest IV fluids and symptomatic treatment for pain and nausea.  Patient further evaluated by gastroenterology and they recommend outpatient MRI of the abdomen in 6 to 8 weeks.  Patient responded to the treatment started on liquid diet and advance as tolerated.  Patient now tolerating regular diet with no nausea no pain and wants to go home.    Discharge diet regular    Activity as tolerated    Medication as above    Follow-up with prime doctor in 1 week and follow-up with gastroenterology per the instructions and take medication as directed    CARMITA HYATT MD    Electronically signed by Carmita Hyatt MD at 11/10/24 1254       Discharge Order (From admission, onward)       Start     Ordered    11/10/24 1250  Discharge patient  Once        Expected Discharge Date: 11/10/24   Discharge  Disposition: Home or Self Care   Physician of Record for Attribution - Please select from Treatment Team: CARMITA ALDRICH [[id number]]   Review needed by CMO to determine Physician of Record: No      Question Answer Comment   Physician of Record for Attribution - Please select from Treatment Team CARMITA ALDRICH    Review needed by CMO to determine Physician of Record No        11/10/24 6306

## 2024-11-14 ENCOUNTER — READMISSION MANAGEMENT (OUTPATIENT)
Dept: CALL CENTER | Facility: HOSPITAL | Age: 49
End: 2024-11-14
Payer: COMMERCIAL

## 2024-11-14 NOTE — OUTREACH NOTE
Medical Week 1 Survey      Flowsheet Row Responses   [facility] patient discharged from? [place]   Does the patient have one of the following disease processes/diagnoses(primary or secondary)? Other   Week 1 attempt successful? Yes   Call start time 1002   Call end time 1005   Discharge diagnosis Acute pancreatitis   Meds reviewed with patient/caregiver? Yes   Is the patient having any side effects they believe may be caused by any medication additions or changes? No   Does the patient have all medications ordered at discharge? Yes   Is the patient taking all medications as directed (includes completed medication regime)? Yes   Does the patient have a primary care provider?  Yes   Does the patient have an appointment with their PCP within 7 days of discharge? Yes   Has the patient kept scheduled appointments due by today? N/A   Comments Has appt with PCP and GI   Psychosocial issues? No   Did the patient receive a copy of their discharge instructions? Yes   Nursing interventions Reviewed instructions with patient   What is the patient's perception of their health status since discharge? Improving   Is the patient/caregiver able to teach back signs and symptoms related to disease process for when to call PCP? Yes   Is the patient/caregiver able to teach back signs and symptoms related to disease process for when to call 911? Yes   Is the patient/caregiver able to teach back the hierarchy of who to call/visit for symptoms/problems? PCP, Specialist, Home health nurse, Urgent Care, ED, 911 Yes   If the patient is a current smoker, are they able to teach back resources for cessation? Not a smoker   Additional teach back comments She is doing well.   Week 1 call completed? Yes   Graduated Yes   Graduated/Revoked comments No questions or needs at this time.   Call end time 1005            Ciera ARAMBULA - Licensed Nurse

## 2024-12-26 ENCOUNTER — OFFICE VISIT (OUTPATIENT)
Dept: GASTROENTEROLOGY | Facility: CLINIC | Age: 49
End: 2024-12-26
Payer: COMMERCIAL

## 2024-12-26 VITALS
WEIGHT: 129 LBS | HEIGHT: 63 IN | DIASTOLIC BLOOD PRESSURE: 93 MMHG | SYSTOLIC BLOOD PRESSURE: 143 MMHG | TEMPERATURE: 97.3 F | BODY MASS INDEX: 22.86 KG/M2 | HEART RATE: 65 BPM

## 2024-12-26 DIAGNOSIS — K85.90 ACUTE ON CHRONIC PANCREATITIS: Primary | ICD-10-CM

## 2024-12-26 DIAGNOSIS — K58.1 IRRITABLE BOWEL SYNDROME WITH CONSTIPATION: ICD-10-CM

## 2024-12-26 DIAGNOSIS — K86.1 ACUTE ON CHRONIC PANCREATITIS: Primary | ICD-10-CM

## 2024-12-26 RX ORDER — PANCRELIPASE LIPASE, PANCRELIPASE PROTEASE, PANCRELIPASE AMYLASE 10000; 32000; 42000 [USP'U]/1; [USP'U]/1; [USP'U]/1
2 CAPSULE, DELAYED RELEASE ORAL
Qty: 270 CAPSULE | Refills: 3 | Status: SHIPPED | OUTPATIENT
Start: 2024-12-26

## 2024-12-26 RX ORDER — DICYCLOMINE HYDROCHLORIDE 10 MG/1
10 CAPSULE ORAL 4 TIMES DAILY PRN
Qty: 120 CAPSULE | Refills: 6 | Status: SHIPPED | OUTPATIENT
Start: 2024-12-26

## 2024-12-26 NOTE — PROGRESS NOTES
"Chief Complaint  Pancreatitis and Constipation    Subjective          History Of Present Illness:    Bushra Henriquez is a  49 y.o. female patient with a history of anxiety, depression, gastroparesis, chronic pancreatitis who presents as a hospital follow-up for pancreatitis.    Patient was seen by me in the hospital on 11/6/24 to 11/8/2024 in the setting of pancreatitis.  She did report some alcohol use before the onset of the pancreatitis.    Patient presents today as follow-up.  She is is not experiencing any further abdominal pain. Patient reports constipation . She feels that her bowel movements smell malodorous. She is utilizing sennakot and dulcolax and is still not moving her bowels daily.  She was previously on Linzess which was helpful.  No black or bloody stools.  She does endorse some lower abdominal cramping and is taking dicyclomine.  She reports some weight gain.  Appetite is returned to baseline.  She does remain on Zenpep which was initially started by Dr. Maldonado for her pancreatitis.    Colonoscopy a couple years ago that had benign polyps.    CT Abdomen Pelvis With Contrast    Result Date: 11/5/2024  1. Acute interstitial edematous pancreatitis. 2. Post cholecystectomy. 3. Bilateral adrenal nodules are indeterminate in density by contrast-enhanced CT and can be further evaluated with nonemergent outpatient noncontrast abdominal CT. Adrenal adenomas would be most likely.  This report was finalized on 11/5/2024 5:25 PM by Dr. Irineo Villa M.D on Workstation: [id number]        Objective   Vital Signs:   /93   Pulse 65   Temp 97.3 °F (36.3 °C)   Ht 160 cm (63\")   Wt 58.5 kg (129 lb)   BMI 22.85 kg/m²       Physical Exam  Vitals reviewed.   Constitutional:       General: She is not in acute distress.     Appearance: Normal appearance. She is not ill-appearing.   HENT:      Head: Normocephalic and atraumatic.      Nose: Nose normal.      Mouth/Throat:      Pharynx: Oropharynx is clear. "   Eyes:      Conjunctiva/sclera: Conjunctivae normal.   Pulmonary:      Effort: Pulmonary effort is normal.   Abdominal:      General: There is no distension.      Palpations: Abdomen is soft. There is no mass.      Tenderness: There is no abdominal tenderness.   Musculoskeletal:         General: No swelling. Normal range of motion.      Cervical back: Normal range of motion.   Skin:     General: Skin is warm and dry.      Findings: No bruising or rash.   Neurological:      General: No focal deficit present.      Mental Status: She is alert and oriented to person, place, and time.      Motor: No weakness.      Gait: Gait normal.   Psychiatric:         Mood and Affect: Mood normal.          Result Review :   The following data was reviewed by: Arlyn Sheriff PA-C on 12/26/2024:  CMP          11/8/2024    06:30 11/9/2024    07:40 11/10/2024    04:56   CMP   Glucose 75  96  116    BUN <2  <2  <2    Creatinine 0.46  0.38  0.49    EGFR 117.5  123.0  115.7    Sodium 136  139  139    Potassium 3.4  3.4  3.6    Chloride 99  102  101    Calcium 8.0  8.5  8.7    Anion Gap 13.0  10.8  10.2      CBC          11/8/2024    06:30 11/9/2024    04:45 11/10/2024    04:56   CBC   WBC 7.74  6.29  5.93    RBC 2.95  2.49  3.15    Hemoglobin 10.5  8.5  10.8    Hematocrit 30.0  25.3  32.1    .7  101.6  101.9    MCH 35.6  34.1  34.3    MCHC 35.0  33.6  33.6    RDW 11.4  11.2  11.5    Platelets 163  179  245            Assessment and Plan    Diagnoses and all orders for this visit:    1. Acute on chronic pancreatitis (Primary)  -     Pancrelipase, Lip-Prot-Amyl, (Zenpep) 37865-59316 units capsule delayed-release particles; Take 2 capsules by mouth 4 (Four) Times a Day With Meals & at Bedtime.  Dispense: 270 capsule; Refill: 3  -     MRI abdomen w wo contrast mrcp; Future  -     CBC (No Diff)  -     Comprehensive Metabolic Panel  -     Lipase    2. Irritable bowel syndrome with constipation  -     linaclotide (Linzess) 72 MCG  capsule capsule; Take 1 capsule by mouth Every Morning Before Breakfast.  Dispense: 90 capsule; Refill: 3  -     dicyclomine (BENTYL) 10 MG capsule; Take 1 capsule by mouth 4 (Four) Times a Day As Needed for Abdominal Cramping.  Dispense: 120 capsule; Refill: 6       Check updated labs including CBC, CMP, lipase  Continue Shawn Pap  Check MRI in 4 to 6-weeks  Start Linzess for IBS-C  Okay to continue to use dicyclomine as needed for abdominal cramping    Follow Up   Return in about 3 months (around 3/26/2025) for Arlyn Wong PA-C.    Dragon dictation used throughout this note.            Arlyn Wong PA-C   [facility], [facility]  [address] [place] [address]  Office: [phone]

## 2024-12-27 LAB
ALBUMIN SERPL-MCNC: 4.5 G/DL (ref 3.5–5.2)
ALBUMIN/GLOB SERPL: 1.8 G/DL
ALP SERPL-CCNC: 44 U/L (ref 39–117)
ALT SERPL-CCNC: 16 U/L (ref 1–33)
AST SERPL-CCNC: 16 U/L (ref 1–32)
BILIRUB SERPL-MCNC: 0.2 MG/DL (ref 0–1.2)
BUN SERPL-MCNC: 8 MG/DL (ref 6–20)
BUN/CREAT SERPL: 12.7 (ref 7–25)
CALCIUM SERPL-MCNC: 10 MG/DL (ref 8.6–10.5)
CHLORIDE SERPL-SCNC: 105 MMOL/L (ref 98–107)
CO2 SERPL-SCNC: 31.4 MMOL/L (ref 22–29)
CREAT SERPL-MCNC: 0.63 MG/DL (ref 0.57–1)
EGFRCR SERPLBLD CKD-EPI 2021: 108.9 ML/MIN/1.73
ERYTHROCYTE [DISTWIDTH] IN BLOOD BY AUTOMATED COUNT: 11.6 % (ref 12.3–15.4)
GLOBULIN SER CALC-MCNC: 2.5 GM/DL
GLUCOSE SERPL-MCNC: 88 MG/DL (ref 65–99)
HCT VFR BLD AUTO: 38.2 % (ref 34–46.6)
HGB BLD-MCNC: 12.9 G/DL (ref 12–15.9)
LIPASE SERPL-CCNC: 83 U/L (ref 13–60)
MCH RBC QN AUTO: 33.6 PG (ref 26.6–33)
MCHC RBC AUTO-ENTMCNC: 33.8 G/DL (ref 31.5–35.7)
MCV RBC AUTO: 99.5 FL (ref 79–97)
PLATELET # BLD AUTO: 306 10*3/MM3 (ref 140–450)
POTASSIUM SERPL-SCNC: 4.2 MMOL/L (ref 3.5–5.2)
PROT SERPL-MCNC: 7 G/DL (ref 6–8.5)
RBC # BLD AUTO: 3.84 10*6/MM3 (ref 3.77–5.28)
SODIUM SERPL-SCNC: 142 MMOL/L (ref 136–145)
WBC # BLD AUTO: 7.35 10*3/MM3 (ref 3.4–10.8)

## 2025-01-15 ENCOUNTER — PATIENT MESSAGE (OUTPATIENT)
Dept: GASTROENTEROLOGY | Facility: CLINIC | Age: 50
End: 2025-01-15

## 2025-02-27 ENCOUNTER — TELEPHONE (OUTPATIENT)
Dept: OBSTETRICS AND GYNECOLOGY | Age: 50
End: 2025-02-27

## 2025-02-27 NOTE — TELEPHONE ENCOUNTER
ABI VALDEZ      [phone]    PT WOULD LIKE TO SCHEDULE MAMMO (NO IMPLANTS)    LAST MAMMO 11/15/23

## 2025-03-10 NOTE — CASE MANAGEMENT/SOCIAL WORK
Case Management Discharge Note      Final Note: Home. Orders reviewed no further discharge needs.         Selected Continued Care - Discharged on 11/10/2024 Admission date: 11/5/2024 - Discharge disposition: Home or Self Care      Destination    No services have been selected for the patient.                Durable Medical Equipment    No services have been selected for the patient.                Dialysis/Infusion    No services have been selected for the patient.                Home Medical Care    No services have been selected for the patient.                Therapy    No services have been selected for the patient.                Community Resources    No services have been selected for the patient.                Community & DME    No services have been selected for the patient.                         Final Discharge Disposition Code: 01 - home or self-care   Continue Regimen: clobetasol 0.05 % topical cream BID Discontinue Regimen: cephalexin 500 mg tablet Initiate Treatment: Allegra 180 mg Detail Level: Zone Render In Strict Bullet Format?: No Continue Regimen: Clobetasol cram twice daily for two weeks,off two weeks, on two weeks

## 2025-03-11 ENCOUNTER — TELEPHONE (OUTPATIENT)
Dept: OBSTETRICS AND GYNECOLOGY | Age: 50
End: 2025-03-11
Payer: COMMERCIAL

## 2025-03-11 DIAGNOSIS — Z12.31 VISIT FOR SCREENING MAMMOGRAM: Primary | ICD-10-CM

## 2025-03-11 NOTE — TELEPHONE ENCOUNTER
Caller: Bushra Henriquez    Relationship to patient: Self    Best call back number: [phone] (home)     Patient is needing: PT NEEDING TO SCHEDULE MAMMOGRAM.

## 2025-03-24 ENCOUNTER — TELEPHONE (OUTPATIENT)
Dept: GASTROENTEROLOGY | Facility: CLINIC | Age: 50
End: 2025-03-24
Payer: COMMERCIAL

## 2025-04-02 ENCOUNTER — TELEPHONE (OUTPATIENT)
Dept: OBSTETRICS AND GYNECOLOGY | Age: 50
End: 2025-04-02
Payer: COMMERCIAL

## 2025-04-02 ENCOUNTER — HOSPITAL ENCOUNTER (OUTPATIENT)
Facility: HOSPITAL | Age: 50
Discharge: HOME | End: 2025-04-02
Payer: COMMERCIAL

## 2025-04-02 DIAGNOSIS — Z12.31 VISIT FOR SCREENING MAMMOGRAM: ICD-10-CM

## 2025-04-02 NOTE — TELEPHONE ENCOUNTER
4/2/2025 Pt came in for screening mammogram today but c/o breast lumps in both breasts, mammo cancelled. Pt will need an appt with a provider  to see what imaging needs to be ordered. I will work on getting pt scheduled

## 2025-04-08 ENCOUNTER — HOSPITAL ENCOUNTER (OUTPATIENT)
Dept: MRI IMAGING | Facility: HOSPITAL | Age: 50
Discharge: HOME | End: 2025-04-08
Admitting: PHYSICIAN ASSISTANT
Payer: COMMERCIAL

## 2025-04-08 DIAGNOSIS — K86.1 ACUTE ON CHRONIC PANCREATITIS: ICD-10-CM

## 2025-04-08 DIAGNOSIS — K85.90 ACUTE ON CHRONIC PANCREATITIS: ICD-10-CM

## 2025-04-08 PROCEDURE — 74183 MRI ABD W/O CNTR FLWD CNTR: CPT

## 2025-04-08 PROCEDURE — A9577 INJ MULTIHANCE: HCPCS | Performed by: PHYSICIAN ASSISTANT

## 2025-04-08 PROCEDURE — 25510000002 GADOBENATE DIMEGLUMINE 529 MG/ML SOLUTION: Performed by: PHYSICIAN ASSISTANT

## 2025-04-08 RX ADMIN — GADOBENATE DIMEGLUMINE 12 ML: 529 INJECTION, SOLUTION INTRAVENOUS at 07:29

## 2025-05-29 ENCOUNTER — TELEPHONE (OUTPATIENT)
Dept: GASTROENTEROLOGY | Facility: CLINIC | Age: 50
End: 2025-05-29
Payer: COMMERCIAL

## 2025-05-29 ENCOUNTER — OFFICE VISIT (OUTPATIENT)
Dept: GASTROENTEROLOGY | Facility: CLINIC | Age: 50
End: 2025-05-29
Payer: COMMERCIAL

## 2025-05-29 VITALS
HEIGHT: 62 IN | TEMPERATURE: 97.8 F | BODY MASS INDEX: 23.15 KG/M2 | DIASTOLIC BLOOD PRESSURE: 85 MMHG | SYSTOLIC BLOOD PRESSURE: 122 MMHG | WEIGHT: 125.8 LBS | HEART RATE: 73 BPM

## 2025-05-29 DIAGNOSIS — Z12.12 SCREENING FOR COLORECTAL CANCER: ICD-10-CM

## 2025-05-29 DIAGNOSIS — Z87.19 HISTORY OF PANCREATITIS: ICD-10-CM

## 2025-05-29 DIAGNOSIS — K58.1 IRRITABLE BOWEL SYNDROME WITH CONSTIPATION: Primary | ICD-10-CM

## 2025-05-29 DIAGNOSIS — Z86.0109 PERSONAL HISTORY OF OTHER COLON POLYPS: ICD-10-CM

## 2025-05-29 DIAGNOSIS — Z12.11 SCREENING FOR COLORECTAL CANCER: ICD-10-CM

## 2025-05-29 RX ORDER — TOPIRAMATE 25 MG/1
TABLET, FILM COATED ORAL
COMMUNITY
Start: 2025-05-12

## 2025-05-29 RX ORDER — HYDROXYZINE HYDROCHLORIDE 25 MG/1
TABLET, FILM COATED ORAL
COMMUNITY
Start: 2025-05-12

## 2025-05-29 RX ORDER — VORTIOXETINE 5 MG/1
1 TABLET, FILM COATED ORAL DAILY
COMMUNITY
Start: 2025-04-23

## 2025-05-29 NOTE — PROGRESS NOTES
"Chief Complaint  Pancreatitis, Irritable Bowel Syndrome, Abdominal Pain, and Constipation    Subjective          History Of Present Illness:    Bushra Henriquez is a  49 y.o. female with a history of anxiety, depression, gastroparesis, chronic pancreatitis who presents as a follow-up for pancreatitis.     Patient is doing fairly well.  She was off Linzess for about a month due to inability to afford the medication.  She is now back on the therapy and bowels are moving more regularly.  She denies any abdominal pain, melena, hematochezia.  She does remain on Zenpep before meals.  She does have Bentyl as needed for abdominal cramping.  No abnormal weight loss or poor appetite.    Additional data reviewed:  Colonoscopy a couple years ago that had benign polyps.     CT Abdomen Pelvis With Contrast     Result Date: 11/5/2024  1. Acute interstitial edematous pancreatitis. 2. Post cholecystectomy. 3. Bilateral adrenal nodules are indeterminate in density by contrast-enhanced CT and can be further evaluated with nonemergent outpatient noncontrast abdominal CT. Adrenal adenomas would be most likely.  This report was finalized on 11/5/2024 5:25 PM by Dr. Irineo Villa M.D on Workstation: [id number]        MRCP with findings of pancreatic divisum, no peripancreatic fluid collection seen, nodular focus within the lingula    Objective   Vital Signs:   /85   Pulse 73   Temp 97.8 °F (36.6 °C)   Ht 157.5 cm (62\")   Wt 57.1 kg (125 lb 12.8 oz)   BMI 23.01 kg/m²       Physical Exam  Vitals reviewed.   Constitutional:       General: She is not in acute distress.     Appearance: Normal appearance. She is not ill-appearing.   HENT:      Head: Normocephalic and atraumatic.      Nose: Nose normal.      Mouth/Throat:      Pharynx: Oropharynx is clear.   Eyes:      Conjunctiva/sclera: Conjunctivae normal.   Pulmonary:      Effort: Pulmonary effort is normal.   Abdominal:      General: There is no distension.      Palpations: " Abdomen is soft. There is no mass.      Tenderness: There is no abdominal tenderness.   Musculoskeletal:         General: No swelling. Normal range of motion.      Cervical back: Normal range of motion.   Skin:     General: Skin is warm and dry.      Findings: No bruising or rash.   Neurological:      General: No focal deficit present.      Mental Status: She is alert and oriented to person, place, and time.      Motor: No weakness.      Gait: Gait normal.   Psychiatric:         Mood and Affect: Mood normal.          Result Review :   The following data was reviewed by: Arlyn Sheriff PA-C on 05/29/2025:  CMP          11/9/2024    07:40 11/10/2024    04:56 12/26/2024    14:32   CMP   Glucose 96  116  88    BUN <2  <2  8    Creatinine 0.38  0.49  0.63    EGFR 123.0  115.7  108.9    Sodium 139  139  142    Potassium 3.4  3.6  4.2    Chloride 102  101  105    Calcium 8.5  8.7  10.0    Total Protein   7.0    Albumin   4.5    Globulin   2.5    Total Bilirubin   0.2    Alkaline Phosphatase   44    AST (SGOT)   16    ALT (SGPT)   16    Albumin/Globulin Ratio   1.8    BUN/Creatinine Ratio   12.7    Anion Gap 10.8  10.2       CBC          11/9/2024    04:45 11/10/2024    04:56 12/26/2024    14:32   CBC   WBC 6.29  5.93  7.35    RBC 2.49  3.15  3.84    Hemoglobin 8.5  10.8  12.9    Hematocrit 25.3  32.1  38.2    .6  101.9  99.5    MCH 34.1  34.3  33.6    MCHC 33.6  33.6  33.8    RDW 11.2  11.5  11.6    Platelets 179  245  306            Assessment and Plan    Diagnoses and all orders for this visit:    1. Irritable bowel syndrome with constipation (Primary)    2. Screening for colorectal cancer  -     Case Request; Standing  -     Implement Anesthesia orders day of procedure.; Standing  -     Follow Anesthesia Guidelines / Protocol; Future  -     Verify bowel prep was successful; Standing  -     Give tap water enema if bowel prep was insufficient; Standing  -     Obtain Informed Consent; Standing  -     Case  Request    3. Personal history of other colon polyps  -     Case Request; Standing  -     Implement Anesthesia orders day of procedure.; Standing  -     Follow Anesthesia Guidelines / Protocol; Future  -     Verify bowel prep was successful; Standing  -     Give tap water enema if bowel prep was insufficient; Standing  -     Obtain Informed Consent; Standing  -     Case Request    4. History of pancreatitis       Continue Linzess with IBS-C, continue Bentyl as needed  We did discuss her pancreatitis history and she has had a couple episodes of pancreatitis with no evidence of chronic pancreatitis on any imaging.  She was initiated on Zenpep by her prior gastroenterologist.  She denies ever undergoing pancreatic elastase testing.  She is interested in seeing if she can come off Zenpep therapy which I think is reasonable.  We can always consider checking a pancreatic elastase.  Continue to avoid alcohol and maintain a low-fat diet with her history of pancreatic divisum  Patient is due for screening colonoscopy.  Will get this scheduled with Dr. Patterson.    Follow Up   Return for Colonoscopy.    Dragon dictation used throughout this note.            Arlyn Wong PA-C   [facility]  [address] [place] [address]  Office: [phone]

## 2025-05-29 NOTE — TELEPHONE ENCOUNTER
GRISEL ACOSTA  for COLONOSCOPY on  08/15 arrive at 9AM    . [facility]

## 2025-06-18 DIAGNOSIS — K85.90 ACUTE ON CHRONIC PANCREATITIS: ICD-10-CM

## 2025-06-18 DIAGNOSIS — K86.1 ACUTE ON CHRONIC PANCREATITIS: ICD-10-CM

## 2025-06-18 RX ORDER — PANCRELIPASE LIPASE, PANCRELIPASE PROTEASE, PANCRELIPASE AMYLASE 10000; 32000; 42000 [USP'U]/1; [USP'U]/1; [USP'U]/1
2 CAPSULE, DELAYED RELEASE ORAL
Qty: 270 CAPSULE | Refills: 10 | Status: SHIPPED | OUTPATIENT
Start: 2025-06-18

## 2025-06-24 ENCOUNTER — TELEPHONE (OUTPATIENT)
Dept: GASTROENTEROLOGY | Facility: CLINIC | Age: 50
End: 2025-06-24
Payer: COMMERCIAL

## 2025-06-24 NOTE — TELEPHONE ENCOUNTER
Message from patient via my chart:     Apparently the linzess requires a PA now. Is this possible to do

## 2025-07-02 ENCOUNTER — TELEPHONE (OUTPATIENT)
Dept: GASTROENTEROLOGY | Facility: CLINIC | Age: 50
End: 2025-07-02

## 2025-07-02 NOTE — TELEPHONE ENCOUNTER
Caller: Bushra Henriquez     Relationship: SELF     Best call back number: [phone]     What is your medical concern? PATIENT HAS BEEN EXPERIENCING GAS AND BLOATING.    How long has this issue been going on? PAST 3 OR 4 DAYS.    Is your provider already aware of this issue? PATIENT SCHEDULED AN APPT WITH HER PCP ALSO.    Have you been treated for this issue? PATIENT TAKES LINZESS AND HAS TAKEN OVER THE COUNTER MEDICATIONS BUT NOTHING HAS HELPED. PATIENT IS ASKING FOR CLINICAL STAFF TO CALL BACK TO ADVISE AND/OR PRESCRIBE SOMETHING THAT WILL HELP.

## 2025-07-02 NOTE — TELEPHONE ENCOUNTER
Called pt, she states she is taking Linzess 145mcg daily but in the past few days she has felt more constipation and has taken [place] 2 tabs BID. Pt states she has a lot of gas that she cannot pass.  Cannot belch or pass gas and has decreased appetite.  Pt has taken Gas-Ex without relief. States abdomen is distended.  Pt reports a liquid BM last night, but is still bloated and has gas she cannot pass.  Pt has appt with PCP tomorrow also.   Advised will send a msg to JELANI Stoddard.

## 2025-07-02 NOTE — TELEPHONE ENCOUNTER
Can we please triage her symptoms.  If she taking the Linzess and over-the-counter medications and her bowels are not moving or she is just experiencing gas and bloating?

## 2025-07-03 NOTE — TELEPHONE ENCOUNTER
Can we see if her primary care did anything today.  If not I would recommend her trialing a double dose of Linzess to see if this gets her bowel movements moving better.  If this works for her we can send her in Linzess 290 mcg daily.  If she is unable to pass flatus, develops worsening nausea, vomiting, worsening abdominal pain, fevers, chills she should go to the emergency room.

## 2025-07-03 NOTE — TELEPHONE ENCOUNTER
Patient called. She states she is on her way to her PCP office now. Advised as per Arlyn's note. Advise to start taking 2 Linzess 145 mcg daily and to call back next week with an update. She verb understanding.

## 2025-07-07 ENCOUNTER — TELEPHONE (OUTPATIENT)
Dept: GASTROENTEROLOGY | Facility: CLINIC | Age: 50
End: 2025-07-07
Payer: COMMERCIAL

## 2025-07-08 ENCOUNTER — TELEPHONE (OUTPATIENT)
Dept: GASTROENTEROLOGY | Facility: CLINIC | Age: 50
End: 2025-07-08
Payer: COMMERCIAL

## 2025-07-24 ENCOUNTER — TELEPHONE (OUTPATIENT)
Dept: GASTROENTEROLOGY | Facility: CLINIC | Age: 50
End: 2025-07-24
Payer: COMMERCIAL

## 2025-07-24 NOTE — TELEPHONE ENCOUNTER
Caller: Bushra Henriquez    Relationship to patient: Self    Best call back number: [phone]    Chief complaint: PT IS WANTING TO SCHEDULE COLONOSCOPY     Type of visit: COLONOSCOPY     Requested date: FIRST AVAILABLE

## 2025-07-25 ENCOUNTER — TELEPHONE (OUTPATIENT)
Dept: GASTROENTEROLOGY | Facility: CLINIC | Age: 50
End: 2025-07-25
Payer: COMMERCIAL

## 2025-07-25 NOTE — TELEPHONE ENCOUNTER
Caller: Bushra Henriquez    Relationship to patient: Self    Best call back number: [phone]      Patient is needing: PT STATED 8-15 IS OKAY TO DO. PLEASE ADVISE.

## 2025-07-25 NOTE — TELEPHONE ENCOUNTER
GRISEL bowling for COLONOSCOPY on 09/23   arrive at  630am  . [facility]

## 2025-08-12 ENCOUNTER — OFFICE VISIT (OUTPATIENT)
Dept: OBSTETRICS AND GYNECOLOGY | Age: 50
End: 2025-08-12
Payer: COMMERCIAL

## 2025-08-12 VITALS
HEIGHT: 62 IN | SYSTOLIC BLOOD PRESSURE: 110 MMHG | WEIGHT: 125 LBS | DIASTOLIC BLOOD PRESSURE: 68 MMHG | BODY MASS INDEX: 23 KG/M2

## 2025-08-12 DIAGNOSIS — K64.4 EXTERNAL HEMORRHOIDS: Primary | ICD-10-CM

## 2025-08-12 DIAGNOSIS — N90.89 PERINEAL CYST IN FEMALE: ICD-10-CM

## 2025-08-12 PROCEDURE — 99213 OFFICE O/P EST LOW 20 MIN: CPT | Performed by: OBSTETRICS & GYNECOLOGY

## 2025-08-12 RX ORDER — PHENTERMINE HYDROCHLORIDE 37.5 MG/1
1 TABLET ORAL DAILY
COMMUNITY
Start: 2025-06-26 | End: 2025-08-13

## 2025-08-12 RX ORDER — SPIRONOLACTONE 25 MG/1
25 TABLET ORAL
COMMUNITY
Start: 2025-08-08

## 2025-08-13 ENCOUNTER — OFFICE VISIT (OUTPATIENT)
Dept: SURGERY | Facility: CLINIC | Age: 50
End: 2025-08-13
Payer: COMMERCIAL

## 2025-08-13 VITALS
SYSTOLIC BLOOD PRESSURE: 134 MMHG | DIASTOLIC BLOOD PRESSURE: 82 MMHG | BODY MASS INDEX: 23.68 KG/M2 | WEIGHT: 128.7 LBS | HEIGHT: 62 IN

## 2025-08-13 DIAGNOSIS — K59.04 CHRONIC IDIOPATHIC CONSTIPATION: ICD-10-CM

## 2025-08-13 DIAGNOSIS — K61.0 PERIANAL ABSCESS: Primary | ICD-10-CM

## 2025-08-13 DIAGNOSIS — K64.4 ANAL SKIN TAG: ICD-10-CM

## 2025-08-13 PROBLEM — E11.69 DISORDER OF NERVOUS SYSTEM DUE TO TYPE 2 DIABETES MELLITUS: Status: ACTIVE | Noted: 2025-08-13

## 2025-08-13 PROBLEM — N39.0 LOWER URINARY TRACT INFECTIOUS DISEASE: Status: ACTIVE | Noted: 2025-08-13

## 2025-08-13 PROBLEM — G98.8 DISORDER OF NERVOUS SYSTEM DUE TO TYPE 2 DIABETES MELLITUS: Status: ACTIVE | Noted: 2025-08-13

## 2025-08-13 PROBLEM — B35.3 TINEA PEDIS: Status: ACTIVE | Noted: 2025-08-13

## 2025-08-13 PROBLEM — L98.499 ULCER OF EXTERNAL EAR: Status: ACTIVE | Noted: 2025-08-13

## 2025-08-13 PROBLEM — R51.9 SINUS HEADACHE: Status: ACTIVE | Noted: 2025-08-13

## 2025-08-13 PROBLEM — K21.9 GASTROESOPHAGEAL REFLUX DISEASE: Status: ACTIVE | Noted: 2025-08-13

## 2025-08-13 PROBLEM — F32.A DEPRESSIVE DISORDER: Status: ACTIVE | Noted: 2025-08-13

## 2025-08-13 PROBLEM — K64.9 HEMORRHOIDS: Status: ACTIVE | Noted: 2025-08-13

## 2025-08-13 PROBLEM — K20.90 ESOPHAGITIS: Status: ACTIVE | Noted: 2025-08-13

## 2025-08-13 PROBLEM — G89.29 CHRONIC BACK PAIN: Status: ACTIVE | Noted: 2025-08-13

## 2025-08-13 PROBLEM — M19.90 ARTHRITIS: Status: ACTIVE | Noted: 2025-08-13

## 2025-08-13 PROBLEM — K58.9 IRRITABLE BOWEL SYNDROME: Status: ACTIVE | Noted: 2025-08-13

## 2025-08-13 PROBLEM — Z86.59 HISTORY OF ANXIETY STATE: Status: ACTIVE | Noted: 2025-08-13

## 2025-08-13 PROBLEM — Z86.79 H/O: HTN (HYPERTENSION): Status: ACTIVE | Noted: 2025-08-13

## 2025-08-13 PROBLEM — B35.1 ONYCHOMYCOSIS: Status: ACTIVE | Noted: 2025-08-13

## 2025-08-13 PROBLEM — K31.84 GASTROPARESIS: Status: ACTIVE | Noted: 2025-08-13

## 2025-08-13 PROBLEM — L30.8 OTHER SPECIFIED DERMATITIS: Status: ACTIVE | Noted: 2025-08-13

## 2025-08-13 PROBLEM — M18.9 CMC ARTHRITIS, THUMB, DEGENERATIVE: Status: ACTIVE | Noted: 2022-03-31

## 2025-08-13 PROBLEM — M54.9 CHRONIC BACK PAIN: Status: ACTIVE | Noted: 2025-08-13

## 2025-08-13 PROBLEM — N63.0 BREAST LUMP: Status: ACTIVE | Noted: 2025-08-13

## 2025-08-13 PROBLEM — F41.9 ANXIETY: Status: ACTIVE | Noted: 2025-08-13

## 2025-08-13 PROCEDURE — 99204 OFFICE O/P NEW MOD 45 MIN: CPT | Performed by: PHYSICIAN ASSISTANT

## 2025-08-13 RX ORDER — VORTIOXETINE 10 MG/1
1 TABLET, FILM COATED ORAL DAILY
COMMUNITY
Start: 2025-05-28 | End: 2025-08-13

## 2025-08-13 RX ORDER — CLOTRIMAZOLE 1 %
CREAM (GRAM) TOPICAL EVERY 12 HOURS SCHEDULED
COMMUNITY
End: 2025-08-13

## 2025-08-13 RX ORDER — LUBIPROSTONE 24 UG/1
24 CAPSULE ORAL 2 TIMES DAILY WITH MEALS
Qty: 60 CAPSULE | Refills: 1 | Status: SHIPPED | OUTPATIENT
Start: 2025-08-13

## 2025-08-13 RX ORDER — TOPIRAMATE 100 MG/1
1 TABLET, FILM COATED ORAL EVERY 12 HOURS SCHEDULED
COMMUNITY
Start: 2025-06-26 | End: 2025-08-13

## 2025-08-19 ENCOUNTER — OFFICE VISIT (OUTPATIENT)
Dept: OBSTETRICS AND GYNECOLOGY | Age: 50
End: 2025-08-19
Payer: COMMERCIAL

## 2025-08-19 VITALS
WEIGHT: 128 LBS | HEIGHT: 62 IN | DIASTOLIC BLOOD PRESSURE: 60 MMHG | BODY MASS INDEX: 23.55 KG/M2 | SYSTOLIC BLOOD PRESSURE: 118 MMHG

## 2025-08-19 DIAGNOSIS — Z12.31 BREAST CANCER SCREENING BY MAMMOGRAM: ICD-10-CM

## 2025-08-19 DIAGNOSIS — N60.01 BENIGN BREAST CYST IN FEMALE, RIGHT: ICD-10-CM

## 2025-08-19 DIAGNOSIS — Z01.419 ENCOUNTER FOR GYNECOLOGICAL EXAMINATION: Primary | ICD-10-CM

## 2025-08-19 DIAGNOSIS — Z12.4 SCREENING FOR CERVICAL CANCER: ICD-10-CM

## 2025-08-19 DIAGNOSIS — K64.1 GRADE II HEMORRHOIDS: ICD-10-CM

## 2025-08-19 DIAGNOSIS — K61.1 PERIRECTAL ABSCESS: ICD-10-CM

## 2025-08-22 LAB
CYTOLOGIST CVX/VAG CYTO: NORMAL
CYTOLOGY CVX/VAG DOC CYTO: NORMAL
CYTOLOGY CVX/VAG DOC THIN PREP: NORMAL
DX ICD CODE: NORMAL
HPV I/H RISK 4 DNA CVX QL PROBE+SIG AMP: NEGATIVE
Lab: NORMAL
OTHER STN SPEC: NORMAL
SERVICE CMNT-IMP: NORMAL
STAT OF ADQ CVX/VAG CYTO-IMP: NORMAL